# Patient Record
Sex: MALE | Race: BLACK OR AFRICAN AMERICAN | NOT HISPANIC OR LATINO | URBAN - METROPOLITAN AREA
[De-identification: names, ages, dates, MRNs, and addresses within clinical notes are randomized per-mention and may not be internally consistent; named-entity substitution may affect disease eponyms.]

---

## 2018-10-21 ENCOUNTER — EMERGENCY (EMERGENCY)
Facility: HOSPITAL | Age: 23
LOS: 1 days | Discharge: LEFT BEFORE DISCHARGE | End: 2018-10-21
Admitting: EMERGENCY MEDICINE
Payer: MEDICAID

## 2018-10-21 VITALS
TEMPERATURE: 98 F | RESPIRATION RATE: 18 BRPM | SYSTOLIC BLOOD PRESSURE: 127 MMHG | HEART RATE: 98 BPM | DIASTOLIC BLOOD PRESSURE: 92 MMHG | OXYGEN SATURATION: 100 %

## 2018-10-21 DIAGNOSIS — R69 ILLNESS, UNSPECIFIED: ICD-10-CM

## 2018-10-21 DIAGNOSIS — F12.10 CANNABIS ABUSE, UNCOMPLICATED: ICD-10-CM

## 2018-10-21 DIAGNOSIS — F29 UNSPECIFIED PSYCHOSIS NOT DUE TO A SUBSTANCE OR KNOWN PHYSIOLOGICAL CONDITION: ICD-10-CM

## 2018-10-21 LAB
ALBUMIN SERPL ELPH-MCNC: 4.5 G/DL — SIGNIFICANT CHANGE UP (ref 3.3–5)
ALP SERPL-CCNC: 70 U/L — SIGNIFICANT CHANGE UP (ref 40–120)
ALT FLD-CCNC: 21 U/L — SIGNIFICANT CHANGE UP (ref 4–41)
AMPHET UR-MCNC: NEGATIVE — SIGNIFICANT CHANGE UP
APAP SERPL-MCNC: < 15 UG/ML — LOW (ref 15–25)
APPEARANCE UR: CLEAR — SIGNIFICANT CHANGE UP
AST SERPL-CCNC: 18 U/L — SIGNIFICANT CHANGE UP (ref 4–40)
BARBITURATES UR SCN-MCNC: NEGATIVE — SIGNIFICANT CHANGE UP
BASOPHILS # BLD AUTO: 0.04 K/UL — SIGNIFICANT CHANGE UP (ref 0–0.2)
BASOPHILS NFR BLD AUTO: 0.5 % — SIGNIFICANT CHANGE UP (ref 0–2)
BENZODIAZ UR-MCNC: NEGATIVE — SIGNIFICANT CHANGE UP
BILIRUB SERPL-MCNC: 0.4 MG/DL — SIGNIFICANT CHANGE UP (ref 0.2–1.2)
BILIRUB UR-MCNC: NEGATIVE — SIGNIFICANT CHANGE UP
BLOOD UR QL VISUAL: NEGATIVE — SIGNIFICANT CHANGE UP
BUN SERPL-MCNC: 12 MG/DL — SIGNIFICANT CHANGE UP (ref 7–23)
CALCIUM SERPL-MCNC: 9.7 MG/DL — SIGNIFICANT CHANGE UP (ref 8.4–10.5)
CANNABINOIDS UR-MCNC: NEGATIVE — SIGNIFICANT CHANGE UP
CHLORIDE SERPL-SCNC: 102 MMOL/L — SIGNIFICANT CHANGE UP (ref 98–107)
CO2 SERPL-SCNC: 24 MMOL/L — SIGNIFICANT CHANGE UP (ref 22–31)
COCAINE METAB.OTHER UR-MCNC: NEGATIVE — SIGNIFICANT CHANGE UP
COLOR SPEC: SIGNIFICANT CHANGE UP
CREAT SERPL-MCNC: 0.86 MG/DL — SIGNIFICANT CHANGE UP (ref 0.5–1.3)
EOSINOPHIL # BLD AUTO: 0.23 K/UL — SIGNIFICANT CHANGE UP (ref 0–0.5)
EOSINOPHIL NFR BLD AUTO: 2.6 % — SIGNIFICANT CHANGE UP (ref 0–6)
ETHANOL BLD-MCNC: < 10 MG/DL — SIGNIFICANT CHANGE UP
GLUCOSE SERPL-MCNC: 91 MG/DL — SIGNIFICANT CHANGE UP (ref 70–99)
GLUCOSE UR-MCNC: NEGATIVE — SIGNIFICANT CHANGE UP
HCT VFR BLD CALC: 44.7 % — SIGNIFICANT CHANGE UP (ref 39–50)
HGB BLD-MCNC: 14.5 G/DL — SIGNIFICANT CHANGE UP (ref 13–17)
IMM GRANULOCYTES # BLD AUTO: 0.03 # — SIGNIFICANT CHANGE UP
IMM GRANULOCYTES NFR BLD AUTO: 0.3 % — SIGNIFICANT CHANGE UP (ref 0–1.5)
KETONES UR-MCNC: NEGATIVE — SIGNIFICANT CHANGE UP
LEUKOCYTE ESTERASE UR-ACNC: NEGATIVE — SIGNIFICANT CHANGE UP
LYMPHOCYTES # BLD AUTO: 2.32 K/UL — SIGNIFICANT CHANGE UP (ref 1–3.3)
LYMPHOCYTES # BLD AUTO: 26.6 % — SIGNIFICANT CHANGE UP (ref 13–44)
MCHC RBC-ENTMCNC: 25.9 PG — LOW (ref 27–34)
MCHC RBC-ENTMCNC: 32.4 % — SIGNIFICANT CHANGE UP (ref 32–36)
MCV RBC AUTO: 79.8 FL — LOW (ref 80–100)
METHADONE UR-MCNC: NEGATIVE — SIGNIFICANT CHANGE UP
MONOCYTES # BLD AUTO: 0.7 K/UL — SIGNIFICANT CHANGE UP (ref 0–0.9)
MONOCYTES NFR BLD AUTO: 8 % — SIGNIFICANT CHANGE UP (ref 2–14)
NEUTROPHILS # BLD AUTO: 5.41 K/UL — SIGNIFICANT CHANGE UP (ref 1.8–7.4)
NEUTROPHILS NFR BLD AUTO: 62 % — SIGNIFICANT CHANGE UP (ref 43–77)
NITRITE UR-MCNC: NEGATIVE — SIGNIFICANT CHANGE UP
NRBC # FLD: 0 — SIGNIFICANT CHANGE UP
OPIATES UR-MCNC: NEGATIVE — SIGNIFICANT CHANGE UP
OXYCODONE UR-MCNC: NEGATIVE — SIGNIFICANT CHANGE UP
PCP UR-MCNC: NEGATIVE — SIGNIFICANT CHANGE UP
PH UR: 6.5 — SIGNIFICANT CHANGE UP (ref 5–8)
PLATELET # BLD AUTO: 272 K/UL — SIGNIFICANT CHANGE UP (ref 150–400)
PMV BLD: 10 FL — SIGNIFICANT CHANGE UP (ref 7–13)
POTASSIUM SERPL-MCNC: 3.6 MMOL/L — SIGNIFICANT CHANGE UP (ref 3.5–5.3)
POTASSIUM SERPL-SCNC: 3.6 MMOL/L — SIGNIFICANT CHANGE UP (ref 3.5–5.3)
PROT SERPL-MCNC: 8.2 G/DL — SIGNIFICANT CHANGE UP (ref 6–8.3)
PROT UR-MCNC: 10 — SIGNIFICANT CHANGE UP
RBC # BLD: 5.6 M/UL — SIGNIFICANT CHANGE UP (ref 4.2–5.8)
RBC # FLD: 14.5 % — SIGNIFICANT CHANGE UP (ref 10.3–14.5)
SALICYLATES SERPL-MCNC: < 5 MG/DL — LOW (ref 15–30)
SODIUM SERPL-SCNC: 139 MMOL/L — SIGNIFICANT CHANGE UP (ref 135–145)
SP GR SPEC: 1.03 — SIGNIFICANT CHANGE UP (ref 1–1.04)
TSH SERPL-MCNC: 1.85 UIU/ML — SIGNIFICANT CHANGE UP (ref 0.27–4.2)
UROBILINOGEN FLD QL: NORMAL — SIGNIFICANT CHANGE UP
WBC # BLD: 8.73 K/UL — SIGNIFICANT CHANGE UP (ref 3.8–10.5)
WBC # FLD AUTO: 8.73 K/UL — SIGNIFICANT CHANGE UP (ref 3.8–10.5)

## 2018-10-21 PROCEDURE — 93010 ELECTROCARDIOGRAM REPORT: CPT

## 2018-10-21 PROCEDURE — 99283 EMERGENCY DEPT VISIT LOW MDM: CPT | Mod: 25

## 2018-10-21 PROCEDURE — 90792 PSYCH DIAG EVAL W/MED SRVCS: CPT

## 2018-10-21 NOTE — ED BEHAVIORAL HEALTH ASSESSMENT NOTE - HPI (INCLUDE ILLNESS QUALITY, SEVERITY, DURATION, TIMING, CONTEXT, MODIFYING FACTORS, ASSOCIATED SIGNS AND SYMPTOMS)
24 y/o male with no past psych history, daily marijuana use    On assessment, pt states his father brought him to the ED to get help for his depression. Pt reports feeling depressed intermittently for years. States he feels "stuck" in his life. Pt also feels that people don't like him and that he makes people "uncomfortable" because he is quiet. Pt was attending [employer] but dropped out about 1 year ago.  Pt admits that he began feeling paranoid starting about 1 month ago. States he was hanging out with people who were dealing drugs. Pt spent a lot of time at their house. States an ACS investigation was initiated 1 month ago because there are children living with these drug dealers. Pt states he sold drugs on occasion until a few months ago but says he stopped doing this. Since the ACS investigation started, pt believes that law enforcement is following him. He believes multiple neighbors are undercover  and are spying on him. He believes his neighbors are watching him when he steps out of his house. He feels law enforcement is out to arrest him. He believes people on the train take pictures of him to monitor him. For the past month, pt reports poor sleep and poor appetite. He reports difficulty concentrating. He sometimes has low energy. He denies anhedonia, denies hopelessness. Pt denies AH/VH. He denies current suicidal ideation, intent, or plan. He does report feeling suicidal on several occasions due to his paranoia. He most recently experienced suicidal ideation about 1-2 weeks ago. He denies any plan or intent. He denies manic symptoms. Pt reports regular marijuana use since age 19 or 20. He reports almost daily use, though states he last used about 1 week ago. He sometimes smokes cigars. States he last smoked a cigar today. He denies other drug use, denies alcohol use. 22 y/o male, single, noncaregiver, lives with mother, works at a [employer] shop, no past psych history, no h/o violence or legal issues, no PMH, daily marijuana use, brought in by father and stepmother for paranoia x 1 month.     On assessment, pt states his father brought him to the ED to get help for his depression. Pt reports feeling depressed intermittently for years. States he feels "stuck" in his life. Pt also feels that people don't like him and that he makes people "uncomfortable" because he is quiet. Pt was attending [employer] but dropped out about 1 year ago.  Pt admits that he began feeling paranoid starting about 1 month ago. States he was hanging out with people who were dealing drugs. Pt spent a lot of time at their house. States an ACS investigation was initiated 1 month ago because there are children living with these drug dealers. Pt states he sold drugs on occasion until a few months ago but says he stopped doing this. Since the ACS investigation started, pt believes that law enforcement is following him. He believes multiple neighbors are undercover  and are spying on him. He believes his neighbors are watching him when he steps out of his house. He feels law enforcement is out to arrest him. He believes people on the train take pictures of him to monitor him. For the past month, pt reports poor sleep and poor appetite. He reports difficulty concentrating. He sometimes has low energy. He denies anhedonia, denies hopelessness. Pt denies AH/VH. He denies current suicidal ideation, intent, or plan. He does report feeling suicidal on several occasions due to his paranoia. He most recently experienced suicidal ideation about 1-2 weeks ago. He denies any plan or intent. He denies manic symptoms. Pt reports regular marijuana use since age 19 or 20. He reports almost daily use, though states he last used about 1 week ago. He sometimes smokes cigars. States he last smoked a cigar today. He denies other drug use, denies alcohol use.    Collateral obtained from pt's father and stepmother. They report he has been paranoid for the past month. He thinks the police are following him. He smokes marijuana regularly. They are unaware of any other drug use or alcohol use. They state he has always been relatively quiet and introverted, but he has appeared more withdrawn and isolative for about 1 year. He dropped out of college last year. He has been eating less than he did in the past, but he is still eating adequately. He is sleeping less overall. He is showering and attending to ADL's. He has not verbalized SI/HI, and he has not been physically aggressive or violent.   Pt has no past psych history, no medical history. No history of suicide attempts or violence. Pt's father was hospitalized once in the past for becoming very paranoid after smoking marijuana. Father's cousin has Schizophrenia. Pt's father and stepmother want pt to get psychiatric treatment, but they deny acute safety concerns.

## 2018-10-21 NOTE — ED ADULT NURSE NOTE - CHIEF COMPLAINT QUOTE
Co paranoia x  1 month, states " I feel like people are watching me. I feel like the police are doing an investigation on me". States he has had suicidal thoughts in the past, denies these thoughts currently. States he smokes marijuana daily, but denies using today. Denies alcohol use. Denies any psych hx in the past.

## 2018-10-21 NOTE — ED PROVIDER NOTE - OBJECTIVE STATEMENT
22 y/o male patient 22 y/o male patient presents to  with c/o "paranoia and anxiety".  Pt states: "I feel like people are following me and I'm being watched".  As per pt's dad, "he also feels like the police are building a case against him".  "Twice he's jumped into the back of my car and said, drive off, drive off, they're following me, they're following me".  Family is concerned that patient may need admission.  PT admits to using marijuana daily and smoking cigars.

## 2018-10-21 NOTE — ED BEHAVIORAL HEALTH ASSESSMENT NOTE - SUMMARY
24 y/o male, single, noncaregiver, lives with mother, works at a [employer] shop, no past psych history, no h/o violence or legal issues, no PMH, daily marijuana use, brought in by father and stepmother for paranoia x 1 month.     Patient denies SI/HI, and he is calm, cooperative, and in good behavioral control. He is caring for himself. Pt's father denies acute safety concerns. Pt does not present an acute danger to self or others and does not meet criteria for involuntary psychiatric admission at this time. Pt declines voluntary psychiatric admission.

## 2018-10-21 NOTE — ED ADULT NURSE NOTE - OBJECTIVE STATEMENT
Patient received in  c/o psych eval, patient endorsed recent onset of paranoia stating "stating I feel like someone is always following me, I feel like i'm being investigated by the FBI". Patient also endorsed recent onset of anxiety and panic attacks. Patient denies SI at time but endorsed SI in the past. denies HI&AH. Patient denies ETOH, endorsed MJ use. safety and comfort measures maintained. will continue to monitor.

## 2018-10-21 NOTE — ED ADULT NURSE NOTE - NSIMPLEMENTINTERV_GEN_ALL_ED
Implemented All Universal Safety Interventions:  [place] to call system. Call bell, personal items and telephone within reach. Instruct patient to call for assistance. Room bathroom lighting operational. Non-slip footwear when patient is off stretcher. Physically safe environment: no spills, clutter or unnecessary equipment. Stretcher in lowest position, wheels locked, appropriate side rails in place.

## 2018-10-21 NOTE — ED ADULT TRIAGE NOTE - CHIEF COMPLAINT QUOTE
Co paranoia x  month, states " I feel like people are watching me. I feel like the police are doing an investigation on me". States he has had suicidal thoughts in the past, denies these thoughts currently. States he smokes marijuana daily, but denies using today. Denies alcohol use. Denies any psych hx in the past. Co paranoia x  1 month, states " I feel like people are watching me. I feel like the police are doing an investigation on me". States he has had suicidal thoughts in the past, denies these thoughts currently. States he smokes marijuana daily, but denies using today. Denies alcohol use. Denies any psych hx in the past.

## 2018-10-21 NOTE — ED ADULT NURSE REASSESSMENT NOTE - NS ED NURSE REASSESS COMMENT FT1
Break Coverage -  pt calm & cooperative d/c by NP pt verbalizing understanding of d/c instructions pt denies Si/Hi/AVh presently pt left the ER accompanied by his father.

## 2018-10-21 NOTE — ED PROVIDER NOTE - MEDICAL DECISION MAKING DETAILS
22 y/o male patient presenting to ED for c/o paranoia.  Medical evaluation performed.  No clinical evidence of acute intoxication or any acute medical issues.  Psychiatric consult completed.  Pt cleared for stable discharge.  Pt and father given brochure for [facility] crisis center for outpatient  f/u.

## 2018-10-21 NOTE — ED BEHAVIORAL HEALTH ASSESSMENT NOTE - DESCRIPTION
calm, cooperative, in good behavioral control    Vital Signs Last 24 Hrs  T(C): 36.7 (21 Oct 2018 15:35), Max: 36.7 (21 Oct 2018 15:35)  T(F): 98 (21 Oct 2018 15:35), Max: 98 (21 Oct 2018 15:35)  HR: 98 (21 Oct 2018 15:35) (98 - 98)  BP: 127/92 (21 Oct 2018 15:35) (127/92 - 127/92)  BP(mean): --  RR: 18 (21 Oct 2018 15:35) (18 - 18)  SpO2: 100% (21 Oct 2018 15:35) (100% - 100%) none see HPI

## 2019-10-02 ENCOUNTER — INPATIENT (INPATIENT)
Facility: HOSPITAL | Age: 24
LOS: 21 days | Discharge: HOME | End: 2019-10-24
Attending: PSYCHIATRY & NEUROLOGY | Admitting: PSYCHIATRY & NEUROLOGY
Payer: COMMERCIAL

## 2019-10-02 VITALS
OXYGEN SATURATION: 100 % | HEART RATE: 84 BPM | DIASTOLIC BLOOD PRESSURE: 64 MMHG | RESPIRATION RATE: 16 BRPM | SYSTOLIC BLOOD PRESSURE: 149 MMHG | TEMPERATURE: 98 F

## 2019-10-02 DIAGNOSIS — F29 UNSPECIFIED PSYCHOSIS NOT DUE TO A SUBSTANCE OR KNOWN PHYSIOLOGICAL CONDITION: ICD-10-CM

## 2019-10-02 LAB
ALBUMIN SERPL ELPH-MCNC: 4.7 G/DL — SIGNIFICANT CHANGE UP (ref 3.3–5)
ALP SERPL-CCNC: 86 U/L — SIGNIFICANT CHANGE UP (ref 40–120)
ALT FLD-CCNC: 20 U/L — SIGNIFICANT CHANGE UP (ref 4–41)
ANION GAP SERPL CALC-SCNC: 12 MMO/L — SIGNIFICANT CHANGE UP (ref 7–14)
ANION GAP SERPL CALC-SCNC: 23 MMO/L — HIGH (ref 7–14)
ANISOCYTOSIS BLD QL: SLIGHT — SIGNIFICANT CHANGE UP
APAP SERPL-MCNC: < 15 UG/ML — LOW (ref 15–25)
AST SERPL-CCNC: 24 U/L — SIGNIFICANT CHANGE UP (ref 4–40)
BASE EXCESS BLDV CALC-SCNC: -2 MMOL/L — SIGNIFICANT CHANGE UP
BASOPHILS # BLD AUTO: 0.06 K/UL — SIGNIFICANT CHANGE UP (ref 0–0.2)
BASOPHILS NFR BLD AUTO: 0.5 % — SIGNIFICANT CHANGE UP (ref 0–2)
BILIRUB SERPL-MCNC: < 0.2 MG/DL — LOW (ref 0.2–1.2)
BLOOD GAS VENOUS - CREATININE: 0.84 MG/DL — SIGNIFICANT CHANGE UP (ref 0.5–1.3)
BUN SERPL-MCNC: 12 MG/DL — SIGNIFICANT CHANGE UP (ref 7–23)
BUN SERPL-MCNC: 15 MG/DL — SIGNIFICANT CHANGE UP (ref 7–23)
CALCIUM SERPL-MCNC: 10.2 MG/DL — SIGNIFICANT CHANGE UP (ref 8.4–10.5)
CALCIUM SERPL-MCNC: 9 MG/DL — SIGNIFICANT CHANGE UP (ref 8.4–10.5)
CHLORIDE BLDV-SCNC: 109 MMOL/L — HIGH (ref 96–108)
CHLORIDE SERPL-SCNC: 107 MMOL/L — SIGNIFICANT CHANGE UP (ref 98–107)
CHLORIDE SERPL-SCNC: 107 MMOL/L — SIGNIFICANT CHANGE UP (ref 98–107)
CO2 SERPL-SCNC: 15 MMOL/L — LOW (ref 22–31)
CO2 SERPL-SCNC: 20 MMOL/L — LOW (ref 22–31)
CREAT SERPL-MCNC: 0.85 MG/DL — SIGNIFICANT CHANGE UP (ref 0.5–1.3)
CREAT SERPL-MCNC: 1.02 MG/DL — SIGNIFICANT CHANGE UP (ref 0.5–1.3)
EOSINOPHIL # BLD AUTO: 0.25 K/UL — SIGNIFICANT CHANGE UP (ref 0–0.5)
EOSINOPHIL NFR BLD AUTO: 1.9 % — SIGNIFICANT CHANGE UP (ref 0–6)
ETHANOL BLD-MCNC: < 10 MG/DL — SIGNIFICANT CHANGE UP
GAS PNL BLDV: 137 MMOL/L — SIGNIFICANT CHANGE UP (ref 136–146)
GLUCOSE BLDV-MCNC: 80 MG/DL — SIGNIFICANT CHANGE UP (ref 70–99)
GLUCOSE SERPL-MCNC: 104 MG/DL — HIGH (ref 70–99)
GLUCOSE SERPL-MCNC: 83 MG/DL — SIGNIFICANT CHANGE UP (ref 70–99)
HCO3 BLDV-SCNC: 22 MMOL/L — SIGNIFICANT CHANGE UP (ref 20–27)
HCT VFR BLD CALC: 43.9 % — SIGNIFICANT CHANGE UP (ref 39–50)
HCT VFR BLDV CALC: 32.6 % — LOW (ref 39–51)
HGB BLD-MCNC: 12 G/DL — LOW (ref 13–17)
HGB BLDV-MCNC: 10.6 G/DL — LOW (ref 13–17)
HYPOCHROMIA BLD QL: SLIGHT — SIGNIFICANT CHANGE UP
IMM GRANULOCYTES NFR BLD AUTO: 0.4 % — SIGNIFICANT CHANGE UP (ref 0–1.5)
LACTATE BLDV-MCNC: 1.1 MMOL/L — SIGNIFICANT CHANGE UP (ref 0.5–2)
LYMPHOCYTES # BLD AUTO: 34.5 % — SIGNIFICANT CHANGE UP (ref 13–44)
LYMPHOCYTES # BLD AUTO: 4.51 K/UL — HIGH (ref 1–3.3)
MAGNESIUM SERPL-MCNC: 2 MG/DL — SIGNIFICANT CHANGE UP (ref 1.6–2.6)
MANUAL SMEAR VERIFICATION: SIGNIFICANT CHANGE UP
MCHC RBC-ENTMCNC: 19.9 PG — LOW (ref 27–34)
MCHC RBC-ENTMCNC: 27.3 % — LOW (ref 32–36)
MCV RBC AUTO: 72.9 FL — LOW (ref 80–100)
MICROCYTES BLD QL: SLIGHT — SIGNIFICANT CHANGE UP
MONOCYTES # BLD AUTO: 1.45 K/UL — HIGH (ref 0–0.9)
MONOCYTES NFR BLD AUTO: 11.1 % — SIGNIFICANT CHANGE UP (ref 2–14)
NEUTROPHILS # BLD AUTO: 6.74 K/UL — SIGNIFICANT CHANGE UP (ref 1.8–7.4)
NEUTROPHILS NFR BLD AUTO: 51.6 % — SIGNIFICANT CHANGE UP (ref 43–77)
NRBC # FLD: 0 K/UL — SIGNIFICANT CHANGE UP (ref 0–0)
PCO2 BLDV: 44 MMHG — SIGNIFICANT CHANGE UP (ref 41–51)
PH BLDV: 7.34 PH — SIGNIFICANT CHANGE UP (ref 7.32–7.43)
PHOSPHATE SERPL-MCNC: 2.9 MG/DL — SIGNIFICANT CHANGE UP (ref 2.5–4.5)
PLATELET # BLD AUTO: 329 K/UL — SIGNIFICANT CHANGE UP (ref 150–400)
PMV BLD: 10.3 FL — SIGNIFICANT CHANGE UP (ref 7–13)
PO2 BLDV: 62 MMHG — HIGH (ref 35–40)
POTASSIUM BLDV-SCNC: 3.4 MMOL/L — SIGNIFICANT CHANGE UP (ref 3.4–4.5)
POTASSIUM SERPL-MCNC: 3.7 MMOL/L — SIGNIFICANT CHANGE UP (ref 3.5–5.3)
POTASSIUM SERPL-MCNC: 4.3 MMOL/L — SIGNIFICANT CHANGE UP (ref 3.5–5.3)
POTASSIUM SERPL-SCNC: 3.7 MMOL/L — SIGNIFICANT CHANGE UP (ref 3.5–5.3)
POTASSIUM SERPL-SCNC: 4.3 MMOL/L — SIGNIFICANT CHANGE UP (ref 3.5–5.3)
PROT SERPL-MCNC: 8.6 G/DL — HIGH (ref 6–8.3)
RBC # BLD: 6.02 M/UL — HIGH (ref 4.2–5.8)
RBC # FLD: 16.8 % — HIGH (ref 10.3–14.5)
REVIEW TO FOLLOW: YES — SIGNIFICANT CHANGE UP
SALICYLATES SERPL-MCNC: < 5 MG/DL — LOW (ref 15–30)
SAO2 % BLDV: 88.9 % — HIGH (ref 60–85)
SODIUM SERPL-SCNC: 139 MMOL/L — SIGNIFICANT CHANGE UP (ref 135–145)
SODIUM SERPL-SCNC: 145 MMOL/L — SIGNIFICANT CHANGE UP (ref 135–145)
TSH SERPL-MCNC: 2.76 UIU/ML — SIGNIFICANT CHANGE UP (ref 0.27–4.2)
WBC # BLD: 13.06 K/UL — HIGH (ref 3.8–10.5)
WBC # FLD AUTO: 13.06 K/UL — HIGH (ref 3.8–10.5)

## 2019-10-02 PROCEDURE — 99285 EMERGENCY DEPT VISIT HI MDM: CPT

## 2019-10-02 RX ORDER — HALOPERIDOL DECANOATE 100 MG/ML
5 INJECTION INTRAMUSCULAR EVERY 6 HOURS
Refills: 0 | Status: DISCONTINUED | OUTPATIENT
Start: 2019-10-02 | End: 2019-10-24

## 2019-10-02 RX ORDER — HALOPERIDOL DECANOATE 100 MG/ML
5 INJECTION INTRAMUSCULAR ONCE
Refills: 0 | Status: COMPLETED | OUTPATIENT
Start: 2019-10-02 | End: 2019-10-02

## 2019-10-02 RX ORDER — HALOPERIDOL DECANOATE 100 MG/ML
5 INJECTION INTRAMUSCULAR ONCE
Refills: 0 | Status: DISCONTINUED | OUTPATIENT
Start: 2019-10-02 | End: 2019-10-24

## 2019-10-02 RX ORDER — RISPERIDONE 4 MG/1
1 TABLET ORAL
Refills: 0 | Status: DISCONTINUED | OUTPATIENT
Start: 2019-10-02 | End: 2019-10-03

## 2019-10-02 RX ADMIN — HALOPERIDOL DECANOATE 5 MILLIGRAM(S): 100 INJECTION INTRAMUSCULAR at 09:01

## 2019-10-02 RX ADMIN — Medication 2 MILLIGRAM(S): at 08:48
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
## 2019-10-02 NOTE — ED ADULT NURSE REASSESSMENT NOTE - NS ED NURSE REASSESS COMMENT FT1
Patient admitted to [facility]/ [facility], report given to Ej TRAVIS, medical clearance complete, pt currently awaiting EMS transport, will continue to monitor pt.

## 2019-10-02 NOTE — ED BEHAVIORAL HEALTH ASSESSMENT NOTE - SUMMARY
22 y/o male, single, noncaregiver, lives with mother, works at a pizza shop, no past psych history, no h/o violence or legal issues, no PMH, daily marijuana use, brought in by EMS for acute agitation. 24 y/o male, single, noncaregiver, lives with mother, works at a [employer] shop, no past psych history, no h/o violence or legal issues, no PMH, daily marijuana use, brought in by EMS for acute agitation.  Patient is acutely agitated on presentation and requires emergent medications with Ativan 2mg IM and then Haldol 5mg IM administered for safety as well as period of 4 point restraints and constant observation.  On assessment, patient is agitated and expresses paranoia, but refuses to fully participate in interview.  Of note when asked about feeling paranoid patient does respond superficially and defensive, appears to be internally stimulated.  Patient denies current suicidal ideation, intent, or plan, denies homicidal ideation or violent thoughts.  He denies drug use, denies alcohol use.  At this time patient is acutely paranoid, appears psychotic and unable to maintain behavioral control without medications, requiring psychiatric hospitalization for safety and stabilization.  Admit to [facility] 4 on a 9.39 involuntary emergent legal status.  No need for constant observation in a locked, supervised setting.  Recommend EMS transportation with buckle guard for safety. 24 y/o male, single, noncaregiver, lives with mother, past psych history of psychosis, no h/o violence or legal issues, no PMH, daily marijuana use, brought in by EMS for acute agitation.  Patient is acutely agitated on presentation and requires emergent medications with Ativan 2mg IM and then Haldol 5mg IM administered for safety as well as period of 4 point restraints and constant observation.  On assessment, patient is agitated and expresses paranoia, but refuses to fully participate in interview.  Of note when asked about feeling paranoid patient does respond superficially and defensive, appears to be internally stimulated.  Patient denies current suicidal ideation, intent, or plan, denies homicidal ideation or violent thoughts.  He denies drug use, denies alcohol use.  At this time patient is acutely paranoid, appears psychotic and unable to maintain behavioral control without medications, requiring psychiatric hospitalization for safety and stabilization.  Admit to [facility] 4 on a 9.39 involuntary emergent legal status.  No need for constant observation in a locked, supervised setting.  Recommend EMS transportation with buckle guard for safety.

## 2019-10-02 NOTE — ED PROVIDER NOTE - OBJECTIVE STATEMENT
This is a 24 yr old m, pmh schizophrenia with c/o increased aggression towards family. Pt arrived to  in handcuff. 911 was activated by his parents due to acting out aggressive behaviour. As per ems family was afraid of him and worried about their own safety. Pt resistive non cooperative.

## 2019-10-02 NOTE — ED BEHAVIORAL HEALTH ASSESSMENT NOTE - SUICIDE PROTECTIVE FACTORS
Supportive social network of family or friends/Identifies reasons for living Supportive social network of family or friends

## 2019-10-02 NOTE — ED BEHAVIORAL HEALTH ASSESSMENT NOTE - DESCRIPTION
Vital Signs Last 24 Hrs  T(C): 36.4 (02 Oct 2019 09:05), Max: 36.5 (02 Oct 2019 08:02)  T(F): 97.5 (02 Oct 2019 09:05), Max: 97.7 (02 Oct 2019 08:02)  HR: 72 (02 Oct 2019 09:05) (72 - 84)  BP: 122/55 (02 Oct 2019 09:05) (122/55 - 149/64)  BP(mean): --  RR: 16 (02 Oct 2019 09:05) (16 - 16)  SpO2: 98% (02 Oct 2019 09:05) (98% - 100%) none see HPI agitated on initial presentation, medicated with Haldol 5mg and Ativan 2mg IM for violent risk with benefit, and required a period of 4-point restraints and 1:1 observation    Vital Signs Last 24 Hrs  T(C): 36.4 (02 Oct 2019 09:05), Max: 36.5 (02 Oct 2019 08:02)  T(F): 97.5 (02 Oct 2019 09:05), Max: 97.7 (02 Oct 2019 08:02)  HR: 72 (02 Oct 2019 09:05) (72 - 84)  BP: 122/55 (02 Oct 2019 09:05) (122/55 - 149/64)  BP(mean): --  RR: 16 (02 Oct 2019 09:05) (16 - 16)  SpO2: 98% (02 Oct 2019 09:05) (98% - 100%)

## 2019-10-02 NOTE — ED BEHAVIORAL HEALTH ASSESSMENT NOTE - OTHER PAST PSYCHIATRIC HISTORY (INCLUDE DETAILS REGARDING ONSET, COURSE OF ILLNESS, INPATIENT/OUTPATIENT TREATMENT)
none Documented history of psychotic disorder (seen at [facility] ED on 10/21/18 and treated and released) and past diagnosis of schizophrenia as per psyckes with [facility] on 4/22/19.  No documented past history of psychiatric hospitalization.  No history of suicide attempts.

## 2019-10-02 NOTE — ED ADULT NURSE NOTE - PATIENT IS UNABLE TO BE SCREENED DUE TO:
----- Message from Galina Augustin RN sent at 4/20/2019  3:58 PM CDT -----  Pt in  ED for COPD exacerbation. Pt has a hard time caring for self at home. Pt states that he would be interested in possibly receiving home care services.    Thanks!  Galina    Patient refused

## 2019-10-02 NOTE — ED ADULT NURSE NOTE - OBJECTIVE STATEMENT
pt brought by EMS from home by family due treating them, agitated, refusing to answering questions, pt refused blood drawn, refused to be exam by MD, pt became agitated, violent, aggressive, was place on 4 points restrains for his self and others pt was medicated, Level 2 started, at 8:49AM and restrained was release at 9:49AM.     pt cooperative at times, will cont. to be reassessed.       Martina Yi RN

## 2019-10-02 NOTE — ED BEHAVIORAL HEALTH ASSESSMENT NOTE - NS ED BHA PLAN ADMIT TO PSYCHIATRY ADMIT TO
Goals of Care/Treatment Preferences The Palliative Medicine team was consulted as part of your/your loved one's care in the hospital. Our team is a supportive service; we strive to relieve suffering and improve quality of life. You met with Dr. Samuel Solomon. We reviewed advance care planning information, which includes the following: 
Confirm Advance Directive: Yes, on file Patient/Health Care Proxy Stated Goals: Prolong life We reviewed / discussed your code status as: DNR 
   Full Code means perform CPR in the event of cardiac arrest. 
    DNR means do NOT perform CPR in the event of cardiac arrest. 
    Partial Code means you have specific preferences, please discuss with your healthcare team. 
    Rishi Pean means this issue was not addressed / resolved during your stay Medical Interventions: Limited additional interventions Other Instructions: While in the hospital a Durable Do Not Resuscitate Order was completed on your behalf. This form should travel with you. While in a facility, the DDNR should be placed on your chart. Once home, it is recommended that this form be placed in a visible location such as on the refrigerator or bedroom door. Because of the importance of this information, we are providing you with a printed copy to share with other healthcare providers after this hospitalization is complete.
[facility]

## 2019-10-02 NOTE — ED ADULT NURSE NOTE - NSSUHOSCREENINGYN_ED_ALL_ED
The patient is a 81y Male complaining of back pain general.
No - the patient is unable to be screened due to medical condition

## 2019-10-02 NOTE — ED BEHAVIORAL HEALTH ASSESSMENT NOTE - DIFFERENTIAL
unspecified psychosis   substance-induced psychosis unspecified psychosis; r/o schizophrenia   substance-induced psychosis

## 2019-10-02 NOTE — ED PROVIDER NOTE - CLINICAL SUMMARY MEDICAL DECISION MAKING FREE TEXT BOX
This is a 24 yr old m, pmh schizophrenia with c/o increased aggression towards family.  Non cooperative, agitative, medication ordered.  Blood work, ua/ toxicology and serum to r/o underlying medical causes.  Psych consult requested. This is a 24 yr old m, pmh schizophrenia with c/o increased aggression towards family.  Non cooperative, agitative, medication ordered.  Blood work, ua/ toxicology and serum to r/o underlying medical causes.  Psych consult requested.- will adm further inpatient psychiatric treatment.

## 2019-10-02 NOTE — ED BEHAVIORAL HEALTH ASSESSMENT NOTE - SUBSTANCE ISSUES AND PLAN (INCLUDE STANDING AND PRN MEDICATION)
no signs or symptoms of substance intoxication or withdrawal, no indication for rehab/detox/CIWA/CINA

## 2019-10-02 NOTE — ED ADULT TRIAGE NOTE - CHIEF COMPLAINT QUOTE
pt from home s/p argument with parents. parents state pt has been dx with schizophrenia in march and noncompliant with meds and fear for their safety. as per ems pt was calm upon arrival but is noncompliant with questioning currently, respirations even and unlabored, no injuries noted. pt from home s/p argument with parents. parents state pt has been dx with schizophrenia in march and noncompliant with meds and fear for their safety, hx of aggression with family. as per ems pt was calm upon arrival but is noncompliant with questioning currently, respirations even and unlabored, no injuries noted.
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
## 2019-10-02 NOTE — ED ADULT NURSE NOTE - CHIEF COMPLAINT QUOTE
pt from home s/p argument with parents. parents state pt has been dx with schizophrenia in march and noncompliant with meds and fear for their safety, hx of aggression with family. as per ems pt was calm upon arrival but is noncompliant with questioning currently, respirations even and unlabored, no injuries noted.
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
## 2019-10-02 NOTE — ED BEHAVIORAL HEALTH ASSESSMENT NOTE - RISK ASSESSMENT
pt is not at acutely elevated risk of harm to self or others pt is is at acutely elevated risk of harm to self or others Low Acute Suicide Risk pt is is at acutely elevated risk of harm to self or others with risk factors of acute agitation, psychosis with history of cannabis abuse.  Not engaged in work and socially isolative, no outpatient psychiatric treatment in place.  Has protective factors as no past psychiatric hospitalizations and supportive family.

## 2019-10-02 NOTE — ED BEHAVIORAL HEALTH ASSESSMENT NOTE - OTHER
father and stepmother see HPI denies AH/VH/TH, appears internally stimulated [id number] father and mother unavailable, previously worked at a pizza shop

## 2019-10-02 NOTE — ED BEHAVIORAL HEALTH ASSESSMENT NOTE - DETAILS
no documented history but refuses to elaborate on exam admit to [facility], message left for Dr. Fisher mother informed of disposition, d/w EM provider

## 2019-10-02 NOTE — ED PROVIDER NOTE - PROGRESS NOTE DETAILS
Hazel NP- pt placed in restrain at 0849 due to agitation and risk to self harm and injury to others. Pt became very resistive was unable to redirect and deescalate situation, medication ordered and adm. Waiting for lab results, psych consult will reassess. Hazel NP- anion gap elevated 23, encourage PO fluid intake after will repeat BMP. Pt awake alert, calm , eating breakfast, no distress, will reevaluate. Hazel NP- repeat labs not concerning, pt will be admitted under psych service for further inpatient treatment. Gong: Patient noted to have improved BMP anion gap improved with eating and PO fluids.  VBG noting no acidosis, no lactate.

## 2019-10-02 NOTE — ED BEHAVIORAL HEALTH ASSESSMENT NOTE - SUICIDE RISK FACTORS
Alcohol/Substance abuse disorders/Insomnia/Access to lethal methods (pills, firearm, etc.: Ask specifically about presence or absence of a firearm in the home or ease of accessing Alcohol/Substance abuse disorders/Psychotic disorder current/past/Agitation/Severe Anxiety/Panic

## 2019-10-03 RX ORDER — RISPERIDONE 4 MG/1
2 TABLET ORAL AT BEDTIME
Refills: 0 | Status: DISCONTINUED | OUTPATIENT
Start: 2019-10-03 | End: 2019-10-04

## 2019-10-04 PROCEDURE — 99232 SBSQ HOSP IP/OBS MODERATE 35: CPT

## 2019-10-04 RX ORDER — RISPERIDONE 4 MG/1
3 TABLET ORAL AT BEDTIME
Refills: 0 | Status: DISCONTINUED | OUTPATIENT
Start: 2019-10-05 | End: 2019-10-07

## 2019-10-04 RX ORDER — RISPERIDONE 4 MG/1
2 TABLET ORAL AT BEDTIME
Refills: 0 | Status: COMPLETED | OUTPATIENT
Start: 2019-10-04 | End: 2019-10-04

## 2019-10-04 RX ADMIN — Medication 2 MILLIGRAM(S): at 23:49

## 2019-10-04 RX ADMIN — RISPERIDONE 2 MILLIGRAM(S): 4 TABLET ORAL at 23:49

## 2019-10-05 PROCEDURE — 99231 SBSQ HOSP IP/OBS SF/LOW 25: CPT

## 2019-10-05 RX ADMIN — Medication 2 MILLIGRAM(S): at 19:59

## 2019-10-05 RX ADMIN — HALOPERIDOL DECANOATE 5 MILLIGRAM(S): 100 INJECTION INTRAMUSCULAR at 12:37

## 2019-10-05 RX ADMIN — RISPERIDONE 3 MILLIGRAM(S): 4 TABLET ORAL at 20:00

## 2019-10-06 PROCEDURE — 99231 SBSQ HOSP IP/OBS SF/LOW 25: CPT

## 2019-10-06 RX ADMIN — HALOPERIDOL DECANOATE 5 MILLIGRAM(S): 100 INJECTION INTRAMUSCULAR at 10:30

## 2019-10-06 RX ADMIN — RISPERIDONE 3 MILLIGRAM(S): 4 TABLET ORAL at 20:00

## 2019-10-06 RX ADMIN — Medication 2 MILLIGRAM(S): at 17:31

## 2019-10-07 PROCEDURE — 99232 SBSQ HOSP IP/OBS MODERATE 35: CPT

## 2019-10-07 RX ORDER — RISPERIDONE 4 MG/1
4 TABLET ORAL AT BEDTIME
Refills: 0 | Status: DISCONTINUED | OUTPATIENT
Start: 2019-10-07 | End: 2019-10-15

## 2019-10-07 RX ORDER — IBUPROFEN 200 MG
400 TABLET ORAL ONCE
Refills: 0 | Status: COMPLETED | OUTPATIENT
Start: 2019-10-07 | End: 2019-10-07

## 2019-10-07 RX ORDER — HALOPERIDOL DECANOATE 100 MG/ML
5 INJECTION INTRAMUSCULAR ONCE
Refills: 0 | Status: COMPLETED | OUTPATIENT
Start: 2019-10-07 | End: 2019-10-07

## 2019-10-07 RX ADMIN — Medication 400 MILLIGRAM(S): at 22:46

## 2019-10-07 RX ADMIN — Medication 2 MILLIGRAM(S): at 23:01

## 2019-10-07 RX ADMIN — Medication 2 MILLIGRAM(S): at 20:12

## 2019-10-07 RX ADMIN — Medication 400 MILLIGRAM(S): at 23:46

## 2019-10-07 RX ADMIN — HALOPERIDOL DECANOATE 5 MILLIGRAM(S): 100 INJECTION INTRAMUSCULAR at 23:01

## 2019-10-07 RX ADMIN — HALOPERIDOL DECANOATE 5 MILLIGRAM(S): 100 INJECTION INTRAMUSCULAR at 20:12

## 2019-10-07 RX ADMIN — RISPERIDONE 4 MILLIGRAM(S): 4 TABLET ORAL at 20:12
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
## 2019-10-08 RX ORDER — ACETAMINOPHEN 500 MG
650 TABLET ORAL EVERY 6 HOURS
Refills: 0 | Status: DISCONTINUED | OUTPATIENT
Start: 2019-10-08 | End: 2019-10-24

## 2019-10-08 RX ADMIN — RISPERIDONE 4 MILLIGRAM(S): 4 TABLET ORAL at 19:39

## 2019-10-08 RX ADMIN — Medication 2 MILLIGRAM(S): at 19:39

## 2019-10-08 RX ADMIN — HALOPERIDOL DECANOATE 5 MILLIGRAM(S): 100 INJECTION INTRAMUSCULAR at 11:57

## 2019-10-08 RX ADMIN — Medication 2 MILLIGRAM(S): at 11:57

## 2019-10-08 RX ADMIN — Medication 650 MILLIGRAM(S): at 19:40

## 2019-10-08 RX ADMIN — Medication 650 MILLIGRAM(S): at 21:20

## 2019-10-08 RX ADMIN — HALOPERIDOL DECANOATE 5 MILLIGRAM(S): 100 INJECTION INTRAMUSCULAR at 19:39

## 2019-10-09 PROCEDURE — 99232 SBSQ HOSP IP/OBS MODERATE 35: CPT

## 2019-10-09 RX ORDER — PALIPERIDONE 1.5 MG/1
1 TABLET, EXTENDED RELEASE ORAL
Qty: 30 | Refills: 0
Start: 2019-10-09 | End: 2019-11-07

## 2019-10-09 RX ADMIN — RISPERIDONE 4 MILLIGRAM(S): 4 TABLET ORAL at 20:09

## 2019-10-09 RX ADMIN — Medication 2 MILLIGRAM(S): at 20:09

## 2019-10-09 RX ADMIN — Medication 2 MILLIGRAM(S): at 08:22

## 2019-10-09 RX ADMIN — HALOPERIDOL DECANOATE 5 MILLIGRAM(S): 100 INJECTION INTRAMUSCULAR at 08:22

## 2019-10-10 RX ADMIN — RISPERIDONE 4 MILLIGRAM(S): 4 TABLET ORAL at 20:29

## 2019-10-10 RX ADMIN — HALOPERIDOL DECANOATE 5 MILLIGRAM(S): 100 INJECTION INTRAMUSCULAR at 15:45

## 2019-10-10 RX ADMIN — HALOPERIDOL DECANOATE 5 MILLIGRAM(S): 100 INJECTION INTRAMUSCULAR at 20:29

## 2019-10-10 RX ADMIN — Medication 2 MILLIGRAM(S): at 20:29

## 2019-10-10 RX ADMIN — Medication 2 MILLIGRAM(S): at 11:48

## 2019-10-11 RX ADMIN — RISPERIDONE 4 MILLIGRAM(S): 4 TABLET ORAL at 21:17

## 2019-10-11 RX ADMIN — HALOPERIDOL DECANOATE 5 MILLIGRAM(S): 100 INJECTION INTRAMUSCULAR at 21:17

## 2019-10-11 RX ADMIN — Medication 2 MILLIGRAM(S): at 15:47

## 2019-10-11 RX ADMIN — Medication 2 MILLIGRAM(S): at 21:17

## 2019-10-11 RX ADMIN — Medication 2 MILLIGRAM(S): at 08:15

## 2019-10-11 RX ADMIN — HALOPERIDOL DECANOATE 5 MILLIGRAM(S): 100 INJECTION INTRAMUSCULAR at 08:15

## 2019-10-11 RX ADMIN — HALOPERIDOL DECANOATE 5 MILLIGRAM(S): 100 INJECTION INTRAMUSCULAR at 15:47

## 2019-10-12 RX ADMIN — HALOPERIDOL DECANOATE 5 MILLIGRAM(S): 100 INJECTION INTRAMUSCULAR at 20:02

## 2019-10-12 RX ADMIN — Medication 2 MILLIGRAM(S): at 20:02

## 2019-10-12 RX ADMIN — Medication 2 MILLIGRAM(S): at 10:09

## 2019-10-12 RX ADMIN — HALOPERIDOL DECANOATE 5 MILLIGRAM(S): 100 INJECTION INTRAMUSCULAR at 10:09

## 2019-10-12 RX ADMIN — RISPERIDONE 4 MILLIGRAM(S): 4 TABLET ORAL at 20:02

## 2019-10-13 RX ADMIN — HALOPERIDOL DECANOATE 5 MILLIGRAM(S): 100 INJECTION INTRAMUSCULAR at 09:45

## 2019-10-13 RX ADMIN — Medication 2 MILLIGRAM(S): at 09:45

## 2019-10-13 RX ADMIN — HALOPERIDOL DECANOATE 5 MILLIGRAM(S): 100 INJECTION INTRAMUSCULAR at 20:24

## 2019-10-13 RX ADMIN — Medication 2 MILLIGRAM(S): at 20:25

## 2019-10-13 RX ADMIN — RISPERIDONE 4 MILLIGRAM(S): 4 TABLET ORAL at 20:25

## 2019-10-14 PROCEDURE — 99233 SBSQ HOSP IP/OBS HIGH 50: CPT

## 2019-10-14 RX ORDER — LITHIUM CARBONATE 300 MG/1
300 TABLET, EXTENDED RELEASE ORAL AT BEDTIME
Refills: 0 | Status: DISCONTINUED | OUTPATIENT
Start: 2019-10-14 | End: 2019-10-15

## 2019-10-14 RX ADMIN — LITHIUM CARBONATE 300 MILLIGRAM(S): 300 TABLET, EXTENDED RELEASE ORAL at 20:12

## 2019-10-14 RX ADMIN — RISPERIDONE 4 MILLIGRAM(S): 4 TABLET ORAL at 20:12

## 2019-10-14 RX ADMIN — Medication 2 MILLIGRAM(S): at 20:12

## 2019-10-14 RX ADMIN — HALOPERIDOL DECANOATE 5 MILLIGRAM(S): 100 INJECTION INTRAMUSCULAR at 20:12

## 2019-10-15 RX ORDER — LITHIUM CARBONATE 300 MG/1
600 TABLET, EXTENDED RELEASE ORAL AT BEDTIME
Refills: 0 | Status: DISCONTINUED | OUTPATIENT
Start: 2019-10-15 | End: 2019-10-16

## 2019-10-15 RX ORDER — PALIPERIDONE 1.5 MG/1
9 TABLET, EXTENDED RELEASE ORAL AT BEDTIME
Refills: 0 | Status: DISCONTINUED | OUTPATIENT
Start: 2019-10-15 | End: 2019-10-24

## 2019-10-15 RX ADMIN — HALOPERIDOL DECANOATE 5 MILLIGRAM(S): 100 INJECTION INTRAMUSCULAR at 20:44

## 2019-10-15 RX ADMIN — Medication 2 MILLIGRAM(S): at 10:24

## 2019-10-15 RX ADMIN — HALOPERIDOL DECANOATE 5 MILLIGRAM(S): 100 INJECTION INTRAMUSCULAR at 10:23

## 2019-10-15 RX ADMIN — PALIPERIDONE 9 MILLIGRAM(S): 1.5 TABLET, EXTENDED RELEASE ORAL at 20:44

## 2019-10-15 RX ADMIN — Medication 2 MILLIGRAM(S): at 20:44

## 2019-10-15 RX ADMIN — LITHIUM CARBONATE 600 MILLIGRAM(S): 300 TABLET, EXTENDED RELEASE ORAL at 20:44

## 2019-10-16 RX ORDER — LITHIUM CARBONATE 300 MG/1
900 TABLET, EXTENDED RELEASE ORAL AT BEDTIME
Refills: 0 | Status: DISCONTINUED | OUTPATIENT
Start: 2019-10-16 | End: 2019-10-24

## 2019-10-16 RX ADMIN — HALOPERIDOL DECANOATE 5 MILLIGRAM(S): 100 INJECTION INTRAMUSCULAR at 09:07

## 2019-10-16 RX ADMIN — Medication 2 MILLIGRAM(S): at 09:07

## 2019-10-16 RX ADMIN — PALIPERIDONE 9 MILLIGRAM(S): 1.5 TABLET, EXTENDED RELEASE ORAL at 20:21

## 2019-10-16 RX ADMIN — LITHIUM CARBONATE 900 MILLIGRAM(S): 300 TABLET, EXTENDED RELEASE ORAL at 20:21

## 2019-10-16 RX ADMIN — HALOPERIDOL DECANOATE 5 MILLIGRAM(S): 100 INJECTION INTRAMUSCULAR at 20:21

## 2019-10-16 RX ADMIN — Medication 2 MILLIGRAM(S): at 20:21

## 2019-10-17 RX ADMIN — PALIPERIDONE 9 MILLIGRAM(S): 1.5 TABLET, EXTENDED RELEASE ORAL at 20:14

## 2019-10-17 RX ADMIN — HALOPERIDOL DECANOATE 5 MILLIGRAM(S): 100 INJECTION INTRAMUSCULAR at 11:20

## 2019-10-17 RX ADMIN — Medication 2 MILLIGRAM(S): at 11:20

## 2019-10-17 RX ADMIN — LITHIUM CARBONATE 900 MILLIGRAM(S): 300 TABLET, EXTENDED RELEASE ORAL at 20:14

## 2019-10-18 RX ORDER — LANOLIN ALCOHOL/MO/W.PET/CERES
3 CREAM (GRAM) TOPICAL AT BEDTIME
Refills: 0 | Status: DISCONTINUED | OUTPATIENT
Start: 2019-10-18 | End: 2019-10-24

## 2019-10-18 RX ORDER — PALIPERIDONE 1.5 MG/1
234 TABLET, EXTENDED RELEASE ORAL ONCE
Refills: 0 | Status: COMPLETED | OUTPATIENT
Start: 2019-10-19 | End: 2019-10-19

## 2019-10-18 RX ADMIN — Medication 3 MILLIGRAM(S): at 21:27

## 2019-10-18 RX ADMIN — PALIPERIDONE 9 MILLIGRAM(S): 1.5 TABLET, EXTENDED RELEASE ORAL at 20:15

## 2019-10-18 RX ADMIN — LITHIUM CARBONATE 900 MILLIGRAM(S): 300 TABLET, EXTENDED RELEASE ORAL at 20:15

## 2019-10-19 RX ADMIN — PALIPERIDONE 9 MILLIGRAM(S): 1.5 TABLET, EXTENDED RELEASE ORAL at 20:43

## 2019-10-19 RX ADMIN — PALIPERIDONE 234 MILLIGRAM(S): 1.5 TABLET, EXTENDED RELEASE ORAL at 09:15

## 2019-10-19 RX ADMIN — Medication 3 MILLIGRAM(S): at 20:44

## 2019-10-19 RX ADMIN — LITHIUM CARBONATE 900 MILLIGRAM(S): 300 TABLET, EXTENDED RELEASE ORAL at 20:43

## 2019-10-20 VITALS — TEMPERATURE: 97 F

## 2019-10-20 RX ADMIN — Medication 2 MILLIGRAM(S): at 20:56

## 2019-10-20 RX ADMIN — Medication 3 MILLIGRAM(S): at 20:56

## 2019-10-20 RX ADMIN — PALIPERIDONE 9 MILLIGRAM(S): 1.5 TABLET, EXTENDED RELEASE ORAL at 20:54

## 2019-10-20 RX ADMIN — LITHIUM CARBONATE 900 MILLIGRAM(S): 300 TABLET, EXTENDED RELEASE ORAL at 20:54

## 2019-10-21 PROCEDURE — 99232 SBSQ HOSP IP/OBS MODERATE 35: CPT

## 2019-10-21 RX ADMIN — PALIPERIDONE 9 MILLIGRAM(S): 1.5 TABLET, EXTENDED RELEASE ORAL at 20:36

## 2019-10-21 RX ADMIN — LITHIUM CARBONATE 900 MILLIGRAM(S): 300 TABLET, EXTENDED RELEASE ORAL at 20:36

## 2019-10-21 RX ADMIN — Medication 3 MILLIGRAM(S): at 20:37

## 2019-10-22 LAB
HBA1C BLD-MCNC: 5.2 % — SIGNIFICANT CHANGE UP (ref 4–5.6)
LITHIUM SERPL-MCNC: 0.78 MMOL/L — SIGNIFICANT CHANGE UP (ref 0.6–1.2)

## 2019-10-22 PROCEDURE — 99232 SBSQ HOSP IP/OBS MODERATE 35: CPT

## 2019-10-22 RX ADMIN — PALIPERIDONE 9 MILLIGRAM(S): 1.5 TABLET, EXTENDED RELEASE ORAL at 20:40

## 2019-10-22 RX ADMIN — Medication 3 MILLIGRAM(S): at 20:40

## 2019-10-22 RX ADMIN — LITHIUM CARBONATE 900 MILLIGRAM(S): 300 TABLET, EXTENDED RELEASE ORAL at 20:40

## 2019-10-23 PROCEDURE — 99233 SBSQ HOSP IP/OBS HIGH 50: CPT

## 2019-10-23 RX ORDER — PALIPERIDONE 1.5 MG/1
156 TABLET, EXTENDED RELEASE ORAL ONCE
Refills: 0 | Status: COMPLETED | OUTPATIENT
Start: 2019-10-24 | End: 2019-10-24

## 2019-10-23 RX ADMIN — Medication 3 MILLIGRAM(S): at 20:29

## 2019-10-23 RX ADMIN — PALIPERIDONE 9 MILLIGRAM(S): 1.5 TABLET, EXTENDED RELEASE ORAL at 20:29

## 2019-10-23 RX ADMIN — LITHIUM CARBONATE 900 MILLIGRAM(S): 300 TABLET, EXTENDED RELEASE ORAL at 20:29

## 2019-10-24 RX ORDER — PALIPERIDONE 1.5 MG/1
156 TABLET, EXTENDED RELEASE ORAL
Qty: 1 | Refills: 0
Start: 2019-10-24 | End: 2019-10-24

## 2019-10-24 RX ORDER — LITHIUM CARBONATE 300 MG/1
3 TABLET, EXTENDED RELEASE ORAL
Qty: 45 | Refills: 0
Start: 2019-10-24 | End: 2019-11-07

## 2019-10-24 RX ADMIN — PALIPERIDONE 156 MILLIGRAM(S): 1.5 TABLET, EXTENDED RELEASE ORAL at 12:32

## 2020-11-29 ENCOUNTER — INPATIENT (INPATIENT)
Facility: HOSPITAL | Age: 25
LOS: 9 days | Discharge: HOME | End: 2020-12-09
Attending: PSYCHIATRY & NEUROLOGY | Admitting: PSYCHIATRY & NEUROLOGY
Payer: MEDICAID

## 2020-11-29 VITALS
TEMPERATURE: 98 F | OXYGEN SATURATION: 96 % | HEIGHT: 66 IN | HEART RATE: 75 BPM | RESPIRATION RATE: 18 BRPM | DIASTOLIC BLOOD PRESSURE: 92 MMHG | SYSTOLIC BLOOD PRESSURE: 142 MMHG

## 2020-11-29 DIAGNOSIS — F25.9 SCHIZOAFFECTIVE DISORDER, UNSPECIFIED: ICD-10-CM

## 2020-11-29 PROBLEM — F20.9 SCHIZOPHRENIA, UNSPECIFIED: Chronic | Status: ACTIVE | Noted: 2019-10-02

## 2020-11-29 LAB
ALBUMIN SERPL ELPH-MCNC: 4.8 G/DL — SIGNIFICANT CHANGE UP (ref 3.3–5)
ALP SERPL-CCNC: 81 U/L — SIGNIFICANT CHANGE UP (ref 40–120)
ALT FLD-CCNC: 22 U/L — SIGNIFICANT CHANGE UP (ref 4–41)
AMPHET UR-MCNC: NEGATIVE — SIGNIFICANT CHANGE UP
ANION GAP SERPL CALC-SCNC: 15 MMO/L — HIGH (ref 7–14)
ANISOCYTOSIS BLD QL: SLIGHT — SIGNIFICANT CHANGE UP
APAP SERPL-MCNC: < 15 UG/ML — LOW (ref 15–25)
APPEARANCE UR: CLEAR — SIGNIFICANT CHANGE UP
AST SERPL-CCNC: 32 U/L — SIGNIFICANT CHANGE UP (ref 4–40)
BACTERIA # UR AUTO: NEGATIVE — SIGNIFICANT CHANGE UP
BARBITURATES UR SCN-MCNC: NEGATIVE — SIGNIFICANT CHANGE UP
BASOPHILS # BLD AUTO: 0.04 K/UL — SIGNIFICANT CHANGE UP (ref 0–0.2)
BASOPHILS NFR BLD AUTO: 0.5 % — SIGNIFICANT CHANGE UP (ref 0–2)
BASOPHILS NFR SPEC: 0 % — SIGNIFICANT CHANGE UP (ref 0–2)
BENZODIAZ UR-MCNC: NEGATIVE — SIGNIFICANT CHANGE UP
BILIRUB SERPL-MCNC: 0.6 MG/DL — SIGNIFICANT CHANGE UP (ref 0.2–1.2)
BILIRUB UR-MCNC: NEGATIVE — SIGNIFICANT CHANGE UP
BLOOD UR QL VISUAL: NEGATIVE — SIGNIFICANT CHANGE UP
BUN SERPL-MCNC: 13 MG/DL — SIGNIFICANT CHANGE UP (ref 7–23)
CALCIUM SERPL-MCNC: 9.9 MG/DL — SIGNIFICANT CHANGE UP (ref 8.4–10.5)
CANNABINOIDS UR-MCNC: POSITIVE — SIGNIFICANT CHANGE UP
CHLORIDE SERPL-SCNC: 96 MMOL/L — LOW (ref 98–107)
CO2 SERPL-SCNC: 24 MMOL/L — SIGNIFICANT CHANGE UP (ref 22–31)
COCAINE METAB.OTHER UR-MCNC: NEGATIVE — SIGNIFICANT CHANGE UP
COLOR SPEC: YELLOW — SIGNIFICANT CHANGE UP
CREAT SERPL-MCNC: 0.98 MG/DL — SIGNIFICANT CHANGE UP (ref 0.5–1.3)
EOSINOPHIL # BLD AUTO: 0.13 K/UL — SIGNIFICANT CHANGE UP (ref 0–0.5)
EOSINOPHIL NFR BLD AUTO: 1.8 % — SIGNIFICANT CHANGE UP (ref 0–6)
EOSINOPHIL NFR FLD: 2.6 % — SIGNIFICANT CHANGE UP (ref 0–6)
ETHANOL BLD-MCNC: < 10 MG/DL — SIGNIFICANT CHANGE UP
GIANT PLATELETS BLD QL SMEAR: PRESENT — SIGNIFICANT CHANGE UP
GLUCOSE SERPL-MCNC: 93 MG/DL — SIGNIFICANT CHANGE UP (ref 70–99)
GLUCOSE UR-MCNC: NEGATIVE — SIGNIFICANT CHANGE UP
HCT VFR BLD CALC: 48.8 % — SIGNIFICANT CHANGE UP (ref 39–50)
HGB BLD-MCNC: 15.2 G/DL — SIGNIFICANT CHANGE UP (ref 13–17)
HYALINE CASTS # UR AUTO: HIGH
IMM GRANULOCYTES NFR BLD AUTO: 0.3 % — SIGNIFICANT CHANGE UP (ref 0–1.5)
KETONES UR-MCNC: NEGATIVE — SIGNIFICANT CHANGE UP
LEUKOCYTE ESTERASE UR-ACNC: NEGATIVE — SIGNIFICANT CHANGE UP
LYMPHOCYTES # BLD AUTO: 1.97 K/UL — SIGNIFICANT CHANGE UP (ref 1–3.3)
LYMPHOCYTES # BLD AUTO: 26.6 % — SIGNIFICANT CHANGE UP (ref 13–44)
LYMPHOCYTES NFR SPEC AUTO: 19.3 % — SIGNIFICANT CHANGE UP (ref 13–44)
MCHC RBC-ENTMCNC: 22.9 PG — LOW (ref 27–34)
MCHC RBC-ENTMCNC: 31.1 % — LOW (ref 32–36)
MCV RBC AUTO: 73.5 FL — LOW (ref 80–100)
METHADONE UR-MCNC: NEGATIVE — SIGNIFICANT CHANGE UP
MICROCYTES BLD QL: SLIGHT — SIGNIFICANT CHANGE UP
MONOCYTES # BLD AUTO: 0.87 K/UL — SIGNIFICANT CHANGE UP (ref 0–0.9)
MONOCYTES NFR BLD AUTO: 11.8 % — SIGNIFICANT CHANGE UP (ref 2–14)
MONOCYTES NFR BLD: 11.4 % — HIGH (ref 2–9)
NEUTROPHIL AB SER-ACNC: 63.2 % — SIGNIFICANT CHANGE UP (ref 43–77)
NEUTROPHILS # BLD AUTO: 4.37 K/UL — SIGNIFICANT CHANGE UP (ref 1.8–7.4)
NEUTROPHILS NFR BLD AUTO: 59 % — SIGNIFICANT CHANGE UP (ref 43–77)
NITRITE UR-MCNC: NEGATIVE — SIGNIFICANT CHANGE UP
NRBC # FLD: 0 K/UL — SIGNIFICANT CHANGE UP (ref 0–0)
OPIATES UR-MCNC: NEGATIVE — SIGNIFICANT CHANGE UP
OXYCODONE UR-MCNC: NEGATIVE — SIGNIFICANT CHANGE UP
PCP UR-MCNC: NEGATIVE — SIGNIFICANT CHANGE UP
PH UR: 6 — SIGNIFICANT CHANGE UP (ref 5–8)
PLATELET # BLD AUTO: 247 K/UL — SIGNIFICANT CHANGE UP (ref 150–400)
PLATELET COUNT - ESTIMATE: NORMAL — SIGNIFICANT CHANGE UP
PMV BLD: 11.8 FL — SIGNIFICANT CHANGE UP (ref 7–13)
POLYCHROMASIA BLD QL SMEAR: SLIGHT — SIGNIFICANT CHANGE UP
POTASSIUM SERPL-MCNC: 3.3 MMOL/L — LOW (ref 3.5–5.3)
POTASSIUM SERPL-SCNC: 3.3 MMOL/L — LOW (ref 3.5–5.3)
PROT SERPL-MCNC: 8.5 G/DL — HIGH (ref 6–8.3)
PROT UR-MCNC: 20 — SIGNIFICANT CHANGE UP
RBC # BLD: 6.64 M/UL — HIGH (ref 4.2–5.8)
RBC # FLD: 20 % — HIGH (ref 10.3–14.5)
RBC CASTS # UR COMP ASSIST: SIGNIFICANT CHANGE UP (ref 0–?)
REVIEW TO FOLLOW: YES — SIGNIFICANT CHANGE UP
SALICYLATES SERPL-MCNC: < 5 MG/DL — LOW (ref 15–30)
SARS-COV-2 IGG SERPL QL IA: POSITIVE
SARS-COV-2 IGM SERPL IA-ACNC: 33.4 INDEX — HIGH
SARS-COV-2 RNA SPEC QL NAA+PROBE: SIGNIFICANT CHANGE UP
SMUDGE CELLS # BLD: PRESENT — SIGNIFICANT CHANGE UP
SODIUM SERPL-SCNC: 135 MMOL/L — SIGNIFICANT CHANGE UP (ref 135–145)
SP GR SPEC: 1.03 — SIGNIFICANT CHANGE UP (ref 1–1.04)
SQUAMOUS # UR AUTO: SIGNIFICANT CHANGE UP
TARGETS BLD QL SMEAR: SLIGHT — SIGNIFICANT CHANGE UP
TSH SERPL-MCNC: 2.12 UIU/ML — SIGNIFICANT CHANGE UP (ref 0.27–4.2)
UROBILINOGEN FLD QL: NORMAL — SIGNIFICANT CHANGE UP
VARIANT LYMPHS # BLD: 3.5 % — SIGNIFICANT CHANGE UP
WBC # BLD: 7.4 K/UL — SIGNIFICANT CHANGE UP (ref 3.8–10.5)
WBC # FLD AUTO: 7.4 K/UL — SIGNIFICANT CHANGE UP (ref 3.8–10.5)
WBC UR QL: SIGNIFICANT CHANGE UP (ref 0–?)

## 2020-11-29 PROCEDURE — 99285 EMERGENCY DEPT VISIT HI MDM: CPT

## 2020-11-29 RX ORDER — RISPERIDONE 4 MG/1
1 TABLET ORAL
Refills: 0 | Status: DISCONTINUED | OUTPATIENT
Start: 2020-11-29 | End: 2020-12-02

## 2020-11-29 RX ORDER — TETANUS TOXOID, REDUCED DIPHTHERIA TOXOID AND ACELLULAR PERTUSSIS VACCINE, ADSORBED 5; 2.5; 8; 8; 2.5 [IU]/.5ML; [IU]/.5ML; UG/.5ML; UG/.5ML; UG/.5ML
0.5 SUSPENSION INTRAMUSCULAR ONCE
Refills: 0 | Status: COMPLETED | OUTPATIENT
Start: 2020-11-29 | End: 2020-11-29

## 2020-11-29 RX ORDER — LITHIUM CARBONATE 300 MG/1
300 TABLET, EXTENDED RELEASE ORAL
Refills: 0 | Status: DISCONTINUED | OUTPATIENT
Start: 2020-11-29 | End: 2020-11-30

## 2020-11-29 RX ORDER — HALOPERIDOL DECANOATE 100 MG/ML
5 INJECTION INTRAMUSCULAR ONCE
Refills: 0 | Status: COMPLETED | OUTPATIENT
Start: 2020-11-29 | End: 2020-11-29

## 2020-11-29 RX ORDER — HALOPERIDOL DECANOATE 100 MG/ML
5 INJECTION INTRAMUSCULAR ONCE
Refills: 0 | Status: DISCONTINUED | OUTPATIENT
Start: 2020-11-29 | End: 2020-11-29

## 2020-11-29 RX ORDER — HALOPERIDOL DECANOATE 100 MG/ML
5 INJECTION INTRAMUSCULAR EVERY 6 HOURS
Refills: 0 | Status: DISCONTINUED | OUTPATIENT
Start: 2020-11-29 | End: 2020-12-09

## 2020-11-29 RX ADMIN — TETANUS TOXOID, REDUCED DIPHTHERIA TOXOID AND ACELLULAR PERTUSSIS VACCINE, ADSORBED 0.5 MILLILITER(S): 5; 2.5; 8; 8; 2.5 SUSPENSION INTRAMUSCULAR at 05:22

## 2020-11-29 RX ADMIN — HALOPERIDOL DECANOATE 5 MILLIGRAM(S): 100 INJECTION INTRAMUSCULAR at 04:56

## 2020-11-29 RX ADMIN — LITHIUM CARBONATE 300 MILLIGRAM(S): 300 TABLET, EXTENDED RELEASE ORAL at 20:06

## 2020-11-29 RX ADMIN — Medication 2 MILLIGRAM(S): at 04:56

## 2020-11-29 RX ADMIN — RISPERIDONE 1 MILLIGRAM(S): 4 TABLET ORAL at 20:06
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
## 2020-11-29 NOTE — ED PROVIDER NOTE - PROGRESS NOTE DETAILS
Pt seen and eval'd by Psych. They will admit pt to [facility] for his condition. Labs unremarkable except for POS cannabinoids.

## 2020-11-29 NOTE — ED BEHAVIORAL HEALTH ASSESSMENT NOTE - DETAILS
pt not cooperative with interview, per chart no prior hx of suicidal ideation / suicidal attempt see ED MD note MARITZA to be notified once covid returns discussed with mother
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
## 2020-11-29 NOTE — ED BEHAVIORAL HEALTH ASSESSMENT NOTE - SUICIDE RISK FACTORS
Psychotic disorder current/past/Impulsivity/Unable to engage in safety planning/Current mood episode/Agitation/Severe Anxiety/Panic
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
## 2020-11-29 NOTE — ED ADULT NURSE REASSESSMENT NOTE - NS ED NURSE REASSESS COMMENT FT1
Received report from night RN pt calm denies si/hi/avh presently pt made aware of admission to [facility] 4 transported via ems.

## 2020-11-29 NOTE — ED BEHAVIORAL HEALTH ASSESSMENT NOTE - DESCRIPTION
uncooperative and agitated at time of presentation, received haldol 5mg IM/ ativan 2mg IM    Vital Signs Last 24 Hrs  T(C): 36.7 (29 Nov 2020 03:53), Max: 36.7 (29 Nov 2020 03:53)  T(F): 98 (29 Nov 2020 03:53), Max: 98 (29 Nov 2020 03:53)  HR: 75 (29 Nov 2020 03:53) (75 - 75)  BP: 142/92 (29 Nov 2020 03:53) (142/92 - 142/92)  BP(mean): --  RR: 18 (29 Nov 2020 03:53) (18 - 18)  SpO2: 96% (29 Nov 2020 03:53) (96% - 96%) none reported per mom see HPI

## 2020-11-29 NOTE — ED BEHAVIORAL HEALTH ASSESSMENT NOTE - PSYCHIATRIC ISSUES AND PLAN (INCLUDE STANDING AND PRN MEDICATION)
EKG SR no ischemic     CTA: < from: CT Heart with Coronaries (01.23.20 @ 09:11) >  The calculated Agatston calcium score is 821.    Severe (approximately 70%) stenosis of the proximal aspect of the first obtuse marginal branch (OM1).     Mild-moderate (approximately 50%) stenosis of the proximal RCA secondary to mixed plaques    Minimal (less than 25%) to mild (25-50%) stenosis of the left anterior descending, mid to distal RCA, left  circumflex arteries.    Far distal part of the LAD is not well evaluated secondary to very low caliber of the vessel.    < end of copied text >    Assessment and Plan     1) Chest pain: CTA abnormal will plan for C c/w asa load plavix 600 and 75 mg po from tomorrow , lipitor 80     2) HTN c/w lisinopril start metoprolol 25 mg po daily     3) DVT PPX heparin     3)
see formulation
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
## 2020-11-29 NOTE — ED BEHAVIORAL HEALTH ASSESSMENT NOTE - RISK ASSESSMENT
Patient is at elevated risk for harm to others given decompensated psychotic illness, untreated mental illness and recent aggressive behaviour, risk would be mitigated by hospitalization to inpatient unit.    Patient with low acute risk for suicide, he has no prior history of suicide attempt or suicide ideation, and per collateral has not recently made any suicidal statements. Risk assessment should be re-evaluated when patient is more cooperative with the interview. Low Acute Suicide Risk

## 2020-11-29 NOTE — ED BEHAVIORAL HEALTH ASSESSMENT NOTE - OTHER PAST PSYCHIATRIC HISTORY (INCLUDE DETAILS REGARDING ONSET, COURSE OF ILLNESS, INPATIENT/OUTPATIENT TREATMENT)
Per chart: Documented history of psychotic disorder (seen at [facility] ED on 10/21/18 and treated and released); admitted to [facility] from [facility] ED 2019 and past diagnosis of schizophrenia vs schizoaffective disorder

## 2020-11-29 NOTE — ED BEHAVIORAL HEALTH ASSESSMENT NOTE - NS ED BHA MED ROS EYES
Cancer Nurse Navigator Note  Met with pt and his wife, Ksenia, during fu visit with Dr. Moreau.  Tray has completed concurrent chemotherapy and radiation the last week of August. He then had 2 cycles of consolidative chemotherapy.     Overall, pt is doing good, but does c/o fatigue.  He is back to work at [employer] He reports having a good appetite. He has gained some weight back.  He is on abx for a chest wound infection.  He is being monitored by ID for this.  His concern right now is the leads in his right chest from when his Pacemaker was moved.  These need to come out.   FU Ct scan on 1/27. The CT showed decrease in lung mass from prev, cardiomegaly with mod to large pericardial effusion, right pleural effusion. Echo ruled out pericardial effusion. He is waiting on cytology results of pleural fluid.      Pt is still smoking, although he states he has cut down.  Offered strategies for smoking cessation. Pt declined at this time.    Pending the outcome of the cytology, pt will be on surveillance vs need for further work up.      Emotional support provided.  Will continue to monitor and support.  Encouraged Tray and Ksenia to contact me with any questions or concerns.      
No complaints
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
## 2020-11-29 NOTE — ED BEHAVIORAL HEALTH ASSESSMENT NOTE - OTHER
EMT and [employer] (not under arrest) activated by mother CVM intense unstated assessment limited secondary to poor engagement pt does not appear internally preoccupied but assessment limited not assessed

## 2020-11-30 RX ORDER — HALOPERIDOL DECANOATE 100 MG/ML
5 INJECTION INTRAMUSCULAR EVERY 4 HOURS
Refills: 0 | Status: DISCONTINUED | OUTPATIENT
Start: 2020-11-30 | End: 2020-12-09

## 2020-11-30 RX ORDER — DIPHENHYDRAMINE HCL 50 MG
50 CAPSULE ORAL ONCE
Refills: 0 | Status: DISCONTINUED | OUTPATIENT
Start: 2020-11-30 | End: 2020-12-09

## 2020-11-30 RX ORDER — DIPHENHYDRAMINE HCL 50 MG
50 CAPSULE ORAL EVERY 4 HOURS
Refills: 0 | Status: DISCONTINUED | OUTPATIENT
Start: 2020-11-30 | End: 2020-12-09

## 2020-11-30 RX ORDER — LITHIUM CARBONATE 300 MG/1
450 TABLET, EXTENDED RELEASE ORAL
Refills: 0 | Status: DISCONTINUED | OUTPATIENT
Start: 2020-11-30 | End: 2020-12-02

## 2020-11-30 RX ORDER — CLONAZEPAM 1 MG
1 TABLET ORAL
Refills: 0 | Status: DISCONTINUED | OUTPATIENT
Start: 2020-11-30 | End: 2020-12-02

## 2020-11-30 RX ADMIN — RISPERIDONE 1 MILLIGRAM(S): 4 TABLET ORAL at 20:28

## 2020-11-30 RX ADMIN — LITHIUM CARBONATE 300 MILLIGRAM(S): 300 TABLET, EXTENDED RELEASE ORAL at 09:01

## 2020-11-30 RX ADMIN — LITHIUM CARBONATE 450 MILLIGRAM(S): 300 TABLET, EXTENDED RELEASE ORAL at 20:27

## 2020-11-30 RX ADMIN — Medication 50 MILLIGRAM(S): at 22:25

## 2020-11-30 RX ADMIN — Medication 1 MILLIGRAM(S): at 20:27

## 2020-11-30 RX ADMIN — RISPERIDONE 1 MILLIGRAM(S): 4 TABLET ORAL at 09:01

## 2020-12-01 LAB
ALBUMIN SERPL ELPH-MCNC: 4.7 G/DL — SIGNIFICANT CHANGE UP (ref 3.3–5)
ALP SERPL-CCNC: 74 U/L — SIGNIFICANT CHANGE UP (ref 40–120)
ALT FLD-CCNC: 22 U/L — SIGNIFICANT CHANGE UP (ref 4–41)
ANION GAP SERPL CALC-SCNC: 13 MMO/L — SIGNIFICANT CHANGE UP (ref 7–14)
AST SERPL-CCNC: 25 U/L — SIGNIFICANT CHANGE UP (ref 4–40)
BILIRUB SERPL-MCNC: 0.4 MG/DL — SIGNIFICANT CHANGE UP (ref 0.2–1.2)
BUN SERPL-MCNC: 13 MG/DL — SIGNIFICANT CHANGE UP (ref 7–23)
CALCIUM SERPL-MCNC: 9.9 MG/DL — SIGNIFICANT CHANGE UP (ref 8.4–10.5)
CHLORIDE SERPL-SCNC: 95 MMOL/L — LOW (ref 98–107)
CHOLEST SERPL-MCNC: 157 MG/DL — SIGNIFICANT CHANGE UP (ref 120–199)
CO2 SERPL-SCNC: 27 MMOL/L — SIGNIFICANT CHANGE UP (ref 22–31)
CREAT SERPL-MCNC: 0.89 MG/DL — SIGNIFICANT CHANGE UP (ref 0.5–1.3)
DIRECT LDL: 116 MG/DL — SIGNIFICANT CHANGE UP
GLUCOSE SERPL-MCNC: 83 MG/DL — SIGNIFICANT CHANGE UP (ref 70–99)
HBA1C BLD-MCNC: 5.4 % — SIGNIFICANT CHANGE UP (ref 4–5.6)
HDLC SERPL-MCNC: 36 MG/DL — SIGNIFICANT CHANGE UP (ref 35–55)
MAGNESIUM SERPL-MCNC: 2.3 MG/DL — SIGNIFICANT CHANGE UP (ref 1.6–2.6)
PHOSPHATE SERPL-MCNC: 2.8 MG/DL — SIGNIFICANT CHANGE UP (ref 2.5–4.5)
POTASSIUM SERPL-MCNC: 3.3 MMOL/L — LOW (ref 3.5–5.3)
POTASSIUM SERPL-SCNC: 3.3 MMOL/L — LOW (ref 3.5–5.3)
PROT SERPL-MCNC: 8 G/DL — SIGNIFICANT CHANGE UP (ref 6–8.3)
SARS-COV-2 IGG SERPL IA-ACNC: 2.4 RATIO — HIGH
SARS-COV-2 IGG SERPL QL IA: POSITIVE
SARS-COV-2 IGG SERPL QL IA: POSITIVE
SARS-COV-2 IGM SERPL IA-ACNC: 2.6 RATIO — HIGH
SODIUM SERPL-SCNC: 135 MMOL/L — SIGNIFICANT CHANGE UP (ref 135–145)
TRIGL SERPL-MCNC: 106 MG/DL — SIGNIFICANT CHANGE UP (ref 10–149)

## 2020-12-01 PROCEDURE — 99232 SBSQ HOSP IP/OBS MODERATE 35: CPT

## 2020-12-01 RX ADMIN — LITHIUM CARBONATE 450 MILLIGRAM(S): 300 TABLET, EXTENDED RELEASE ORAL at 09:06

## 2020-12-01 RX ADMIN — LITHIUM CARBONATE 450 MILLIGRAM(S): 300 TABLET, EXTENDED RELEASE ORAL at 20:15

## 2020-12-01 RX ADMIN — Medication 50 MILLIGRAM(S): at 22:53

## 2020-12-01 RX ADMIN — Medication 1 MILLIGRAM(S): at 09:06

## 2020-12-01 RX ADMIN — RISPERIDONE 1 MILLIGRAM(S): 4 TABLET ORAL at 09:06

## 2020-12-01 RX ADMIN — RISPERIDONE 1 MILLIGRAM(S): 4 TABLET ORAL at 20:15

## 2020-12-01 RX ADMIN — Medication 1 MILLIGRAM(S): at 20:15

## 2020-12-02 PROCEDURE — 99232 SBSQ HOSP IP/OBS MODERATE 35: CPT

## 2020-12-02 RX ORDER — LITHIUM CARBONATE 300 MG/1
900 TABLET, EXTENDED RELEASE ORAL AT BEDTIME
Refills: 0 | Status: DISCONTINUED | OUTPATIENT
Start: 2020-12-03 | End: 2020-12-03

## 2020-12-02 RX ORDER — RISPERIDONE 4 MG/1
3 TABLET ORAL AT BEDTIME
Refills: 0 | Status: DISCONTINUED | OUTPATIENT
Start: 2020-12-02 | End: 2020-12-09

## 2020-12-02 RX ORDER — CLONAZEPAM 1 MG
0.5 TABLET ORAL
Refills: 0 | Status: DISCONTINUED | OUTPATIENT
Start: 2020-12-02 | End: 2020-12-03

## 2020-12-02 RX ORDER — LITHIUM CARBONATE 300 MG/1
450 TABLET, EXTENDED RELEASE ORAL ONCE
Refills: 0 | Status: COMPLETED | OUTPATIENT
Start: 2020-12-02 | End: 2020-12-02

## 2020-12-02 RX ADMIN — Medication 0.5 MILLIGRAM(S): at 20:29

## 2020-12-02 RX ADMIN — RISPERIDONE 3 MILLIGRAM(S): 4 TABLET ORAL at 20:29

## 2020-12-02 RX ADMIN — Medication 1 MILLIGRAM(S): at 08:16

## 2020-12-02 RX ADMIN — LITHIUM CARBONATE 450 MILLIGRAM(S): 300 TABLET, EXTENDED RELEASE ORAL at 08:16

## 2020-12-02 RX ADMIN — RISPERIDONE 1 MILLIGRAM(S): 4 TABLET ORAL at 08:16

## 2020-12-02 RX ADMIN — LITHIUM CARBONATE 450 MILLIGRAM(S): 300 TABLET, EXTENDED RELEASE ORAL at 20:29

## 2020-12-03 PROCEDURE — 99232 SBSQ HOSP IP/OBS MODERATE 35: CPT

## 2020-12-03 RX ORDER — LITHIUM CARBONATE 300 MG/1
1200 TABLET, EXTENDED RELEASE ORAL AT BEDTIME
Refills: 0 | Status: DISCONTINUED | OUTPATIENT
Start: 2020-12-03 | End: 2020-12-09

## 2020-12-03 RX ADMIN — RISPERIDONE 3 MILLIGRAM(S): 4 TABLET ORAL at 20:17

## 2020-12-03 RX ADMIN — LITHIUM CARBONATE 1200 MILLIGRAM(S): 300 TABLET, EXTENDED RELEASE ORAL at 20:17

## 2020-12-04 LAB — LITHIUM SERPL-MCNC: 1.03 MMOL/L — SIGNIFICANT CHANGE UP (ref 0.6–1.2)

## 2020-12-04 PROCEDURE — 99232 SBSQ HOSP IP/OBS MODERATE 35: CPT

## 2020-12-04 RX ADMIN — LITHIUM CARBONATE 1200 MILLIGRAM(S): 300 TABLET, EXTENDED RELEASE ORAL at 20:17

## 2020-12-04 RX ADMIN — Medication 50 MILLIGRAM(S): at 20:18

## 2020-12-04 RX ADMIN — RISPERIDONE 3 MILLIGRAM(S): 4 TABLET ORAL at 20:18

## 2020-12-05 PROCEDURE — 99232 SBSQ HOSP IP/OBS MODERATE 35: CPT

## 2020-12-05 RX ADMIN — Medication 2 MILLIGRAM(S): at 12:48

## 2020-12-05 RX ADMIN — RISPERIDONE 3 MILLIGRAM(S): 4 TABLET ORAL at 20:49

## 2020-12-05 RX ADMIN — LITHIUM CARBONATE 1200 MILLIGRAM(S): 300 TABLET, EXTENDED RELEASE ORAL at 20:49

## 2020-12-06 RX ADMIN — RISPERIDONE 3 MILLIGRAM(S): 4 TABLET ORAL at 20:12

## 2020-12-06 RX ADMIN — LITHIUM CARBONATE 1200 MILLIGRAM(S): 300 TABLET, EXTENDED RELEASE ORAL at 20:12

## 2020-12-07 PROCEDURE — 99232 SBSQ HOSP IP/OBS MODERATE 35: CPT

## 2020-12-07 RX ORDER — PALIPERIDONE 1.5 MG/1
234 TABLET, EXTENDED RELEASE ORAL ONCE
Refills: 0 | Status: COMPLETED | OUTPATIENT
Start: 2020-12-08 | End: 2020-12-08

## 2020-12-07 RX ADMIN — RISPERIDONE 3 MILLIGRAM(S): 4 TABLET ORAL at 20:20

## 2020-12-07 RX ADMIN — LITHIUM CARBONATE 1200 MILLIGRAM(S): 300 TABLET, EXTENDED RELEASE ORAL at 20:20

## 2020-12-08 PROCEDURE — 99232 SBSQ HOSP IP/OBS MODERATE 35: CPT

## 2020-12-08 RX ORDER — ACETAMINOPHEN 500 MG
650 TABLET ORAL ONCE
Refills: 0 | Status: COMPLETED | OUTPATIENT
Start: 2020-12-08 | End: 2020-12-08

## 2020-12-08 RX ORDER — LITHIUM CARBONATE 300 MG/1
4 TABLET, EXTENDED RELEASE ORAL
Qty: 56 | Refills: 0
Start: 2020-12-08 | End: 2020-12-21

## 2020-12-08 RX ADMIN — Medication 650 MILLIGRAM(S): at 17:00

## 2020-12-08 RX ADMIN — Medication 650 MILLIGRAM(S): at 16:14

## 2020-12-08 RX ADMIN — RISPERIDONE 3 MILLIGRAM(S): 4 TABLET ORAL at 22:04

## 2020-12-08 RX ADMIN — PALIPERIDONE 234 MILLIGRAM(S): 1.5 TABLET, EXTENDED RELEASE ORAL at 11:25

## 2020-12-08 RX ADMIN — LITHIUM CARBONATE 1200 MILLIGRAM(S): 300 TABLET, EXTENDED RELEASE ORAL at 22:04

## 2020-12-08 NOTE — BH INPATIENT PSYCHIATRY PROGRESS NOTE - NSBHADMITIPREASONDETAILS_PSY_A_CORE FT
The pt. continues to demonstrate good insight, commitment to outpatient treatment and displays no overt Sx of psychosis. It is possible that he may harbor or minimize some irritability, but remains in good control and is not an acute risk of harm to others or himself. He is appropriate for further management outpatient. Medication compliant. C/O of H/A; admits he has not been keeping hydrated as advised. Will reassess after hydration and Tylenol.

## 2020-12-08 NOTE — BH INPATIENT PSYCHIATRY PROGRESS NOTE - NSBHFUPINTERVALHXFT_PSY_A_CORE
Follow-up for schizoaffective disorder. Chart reviewed and discussed with team. No events reported overnight. Pt. is sleeping and eating normally. He reports that he has had a H/A since last night and believes it is in response to the lithium dose. The patient admits that he has not been drinking enough water as previously advised. He was advised to take a pain reliever, hydrate and will reassess his H/A later today. He denies further SE; Sx of lithium toxicity not assessed. Pt. denied AVH and delusions, SI/HI intent or plan. Medication compliant. No SE noted.

## 2020-12-09 VITALS — SYSTOLIC BLOOD PRESSURE: 126 MMHG | TEMPERATURE: 98 F | DIASTOLIC BLOOD PRESSURE: 78 MMHG
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
## 2020-12-09 NOTE — BH DISCHARGE NOTE NURSING/SOCIAL WORK/PSYCH REHAB - NSDCPRRECOMMEND_PSY_ALL_CORE
Pt will follow up treatment with [facility].  Pt will explore Career development service for additional vocational support.

## 2020-12-09 NOTE — BH INPATIENT PSYCHIATRY PROGRESS NOTE - CURRENT MEDICATION
MEDICATIONS  (STANDING):  lithium SR (LITHOBID) 1200 milliGRAM(s) Oral at bedtime  risperiDONE   Tablet 3 milliGRAM(s) Oral at bedtime    MEDICATIONS  (PRN):  diphenhydrAMINE 50 milliGRAM(s) Oral every 4 hours PRN Agitation/EPS  diphenhydrAMINE   Injectable 50 milliGRAM(s) IntraMuscular once PRN Agitation/EPS  haloperidol     Tablet 5 milliGRAM(s) Oral every 4 hours PRN agitation  haloperidol    Injectable 5 milliGRAM(s) IntraMuscular every 6 hours PRN psychotic agitation  LORazepam     Tablet 2 milliGRAM(s) Oral every 4 hours PRN psychotic agitation  LORazepam   Injectable 2 milliGRAM(s) IntraMuscular once PRN Agitation  
MEDICATIONS  (STANDING):  acetaminophen   Tablet .. 650 milliGRAM(s) Oral once  lithium SR (LITHOBID) 1200 milliGRAM(s) Oral at bedtime  risperiDONE   Tablet 3 milliGRAM(s) Oral at bedtime    MEDICATIONS  (PRN):  diphenhydrAMINE 50 milliGRAM(s) Oral every 4 hours PRN Agitation/EPS  diphenhydrAMINE   Injectable 50 milliGRAM(s) IntraMuscular once PRN Agitation/EPS  haloperidol     Tablet 5 milliGRAM(s) Oral every 4 hours PRN agitation  haloperidol    Injectable 5 milliGRAM(s) IntraMuscular every 6 hours PRN psychotic agitation  LORazepam     Tablet 2 milliGRAM(s) Oral every 4 hours PRN psychotic agitation  LORazepam   Injectable 2 milliGRAM(s) IntraMuscular once PRN Agitation

## 2020-12-09 NOTE — BH INPATIENT PSYCHIATRY PROGRESS NOTE - NSBHMETABOLIC_PSY_ALL_CORE_FT
BMI:   HbA1c:   Glucose: POCT Blood Glucose.: 100 mg/dL (11-29-20 @ 04:15)    BP: 126/78 (12-09-20 @ 08:14) (126/78 - 137/65)  Lipid Panel: Date/Time: 12-01-20 @ 08:41  Cholesterol, Serum: 157  Direct LDL: 116  HDL Cholesterol, Serum: 36  Total Cholesterol/HDL Ration Measurement: --  Triglycerides, Serum: 106  
BMI:   HbA1c:   Glucose: POCT Blood Glucose.: 100 mg/dL (11-29-20 @ 04:15)    BP: 137/65 (12-08-20 @ 08:17) (137/65 - 137/65)  Lipid Panel: Date/Time: 12-01-20 @ 08:41  Cholesterol, Serum: 157  Direct LDL: 116  HDL Cholesterol, Serum: 36  Total Cholesterol/HDL Ration Measurement: --  Triglycerides, Serum: 106

## 2020-12-09 NOTE — BH DISCHARGE NOTE NURSING/SOCIAL WORK/PSYCH REHAB - NSBHREFERPURPOSE2_PSY_ALL_CORE
This appointment is to set you up so that you can get your injection on 12/26/Other.../Mental Health Treatment

## 2020-12-09 NOTE — BH INPATIENT PSYCHIATRY PROGRESS NOTE - NSBHMSESPEECH_PSY_A_CORE
Normal volume, rate, productivity, spontaneity and articulation
Normal volume, rate, productivity, spontaneity and articulation

## 2020-12-09 NOTE — BH INPATIENT PSYCHIATRY PROGRESS NOTE - NSCGIIMPROVESX_PSY_ALL_CORE
1 - Very much improved - nearly all better; good level of functioning; minimal symptoms; represents a very substantial change

## 2020-12-09 NOTE — BH INPATIENT PSYCHIATRY PROGRESS NOTE - NSTREATMENTCERTY_PSY_ALL_CORE
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
## 2020-12-09 NOTE — BH DISCHARGE NOTE NURSING/SOCIAL WORK/PSYCH REHAB - NSDCINSTRUCTIONS_PSY_ALL_CORE_FT
When discharged, take only medications prescribed as instructed by your hospital provider    Do not stop or change any medications until you discuss changes with your own prescriber    Do not take any other medications, including left over medications from before your admission, over the counter medications or herbal supplements, unless you discuss with your own provider
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
## 2020-12-09 NOTE — BH INPATIENT PSYCHIATRY PROGRESS NOTE - PRN MEDS
MEDICATIONS  (PRN):  diphenhydrAMINE 50 milliGRAM(s) Oral every 4 hours PRN Agitation/EPS  diphenhydrAMINE   Injectable 50 milliGRAM(s) IntraMuscular once PRN Agitation/EPS  haloperidol     Tablet 5 milliGRAM(s) Oral every 4 hours PRN agitation  haloperidol    Injectable 5 milliGRAM(s) IntraMuscular every 6 hours PRN psychotic agitation  LORazepam     Tablet 2 milliGRAM(s) Oral every 4 hours PRN psychotic agitation  LORazepam   Injectable 2 milliGRAM(s) IntraMuscular once PRN Agitation  
MEDICATIONS  (PRN):  diphenhydrAMINE 50 milliGRAM(s) Oral every 4 hours PRN Agitation/EPS  diphenhydrAMINE   Injectable 50 milliGRAM(s) IntraMuscular once PRN Agitation/EPS  haloperidol     Tablet 5 milliGRAM(s) Oral every 4 hours PRN agitation  haloperidol    Injectable 5 milliGRAM(s) IntraMuscular every 6 hours PRN psychotic agitation  LORazepam     Tablet 2 milliGRAM(s) Oral every 4 hours PRN psychotic agitation  LORazepam   Injectable 2 milliGRAM(s) IntraMuscular once PRN Agitation

## 2020-12-09 NOTE — BH INPATIENT PSYCHIATRY DISCHARGE NOTE - NSBHDCRISKMITIGATEFT_PSY_ALL_CORE
Suicidal and aggression risk assessments were performed on the day of discharge. The patient is at elevated chronic risk of self-harm due to an underlying mood/psychotic disorder. He is not actively suicidal or manic, making him appropriate for less restrictive treatment as an outpatient without need for continued inpatient hospitalization. Protective factors for suicide include future orientation, social support, treatment engagement, med compliance. Risk factors include access, insomnia, impulsivity.      Immediate risk was minimized by inpatient admission to a safe environment with appropriate supervision and limited access to lethal means. Future risk was minimized before discharge by treatment of anxiety/depressive symptoms, maximizing outpatient support, providing relevant patient education, discussing emergency procedures, and ensuring close follow-up. Patient denies all suicidal and aggressive/homicidal ideation, intent and plan, and, in view of demonstrated clinical improvement, is not judged to be an acute danger to self or others at this time. Although the patient remains at their chronic baseline risk of self-harm, such risk cannot be further ameliorated by continued inpatient treatment and the patient is therefore appropriate for discharge.

## 2020-12-09 NOTE — BH INPATIENT PSYCHIATRY DISCHARGE NOTE - NSBHDCRISKMITIGATE_PSY_ALL_CORE
Family/Other social support involvement/Long acting injectable medication/Medications targeting suicidality/non-suicidal self injurious behavior/Safety planning

## 2020-12-09 NOTE — BH INPATIENT PSYCHIATRY DISCHARGE NOTE - NSBHMETABOLIC_PSY_ALL_CORE_FT
BMI:   HbA1c:   Glucose: POCT Blood Glucose.: 100 mg/dL (11-29-20 @ 04:15)    BP: 126/78 (12-09-20 @ 08:14) (126/78 - 137/65)  Lipid Panel: Date/Time: 12-01-20 @ 08:41  Cholesterol, Serum: 157  Direct LDL: 116  HDL Cholesterol, Serum: 36  Total Cholesterol/HDL Ration Measurement: --  Triglycerides, Serum: 106   BMI: not recorded  HbA1c: 5.4  Glucose: POCT Blood Glucose.: 100 mg/dL (11-29-20 @ 04:15)    BP: 126/78 (12-09-20 @ 08:14) (126/78 - 137/65)  Lipid Panel: Date/Time: 12-01-20 @ 08:41  Cholesterol, Serum: 157  Direct LDL: 116  HDL Cholesterol, Serum: 36  Total Cholesterol/HDL Ration Measurement: --  Triglycerides, Serum: 106

## 2020-12-09 NOTE — BH DISCHARGE NOTE NURSING/SOCIAL WORK/PSYCH REHAB - PATIENT PORTAL LINK FT
You can access the FollowMyHealth Patient Portal offered by [facility] by registering at the following website: [email or web address]. By joining [employer]’s FollowMyHealth portal, you will also be able to view your health information using other applications (apps) compatible with our system.

## 2020-12-09 NOTE — BH INPATIENT PSYCHIATRY PROGRESS NOTE - NSCGISEVERILLNESS_PSY_ALL_CORE
3 = Mildly ill – clearly established symptoms with minimal, if any, distress or difficulty in social and occupational function

## 2020-12-09 NOTE — BH DISCHARGE NOTE NURSING/SOCIAL WORK/PSYCH REHAB - NSDPDISTO_PSY_ALL_CORE
Home/Patient will be going to his cousins house for a few days. He cannot return to his family home. He will then be making his own housing arrangements.

## 2020-12-09 NOTE — BH INPATIENT PSYCHIATRY DISCHARGE NOTE - VIOLENCE RISK FACTORS:
Feeling of being under threat and being unable to control threat/Irritability/Affective dysregulation/Impulsivity
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
## 2020-12-09 NOTE — BH INPATIENT PSYCHIATRY DISCHARGE NOTE - OTHER PAST PSYCHIATRIC HISTORY (INCLUDE DETAILS REGARDING ONSET, COURSE OF ILLNESS, INPATIENT/OUTPATIENT TREATMENT)
Per chart: Documented history of psychotic disorder (seen at [facility] ED on 10/21/18 and treated and released); admitted to [facility] from [facility] ED 2019 and past diagnosis of schizophrenia vs schizoaffective disorder * PPHx of schizophrenia, delusional disorder, cannabis related disorder, at least one prior psychiatric hospitalization ([facility] 2019), no known SA, history of aggression when psychotically decompensated.

## 2020-12-09 NOTE — BH DISCHARGE NOTE NURSING/SOCIAL WORK/PSYCH REHAB - NSDCPRGOAL_PSY_ALL_CORE
Pt made progress on his goals towards his discharge. Pt has demonstrated daily compliant with medication regimen and dosage. Pt has identified benefits of medication compliance such as less AH and mood stabilize during the individual therapy session.      Pt has maintained good behavioral control since his admission. Pt was able ot work on coping skills and early warning signs. Pt denies SI, HI, AH and VH.  Pt has express a need to work on vocational goals in the near futures. Writer provided career development program brochure and reviewed the program with pt. Pt was receptive. Pt showed approximately 30% of group attendance over this past week. During the group session, pt was able to tolerate group structure, participated relevantly with some prompting.     Pt has participated in patient safety and was provided a copy of it. Pt was received a copy of COVID-19 prevention educational handout. Pt was provided with [employer] survey.

## 2020-12-09 NOTE — BH INPATIENT PSYCHIATRY PROGRESS NOTE - NSBHCHARTREVIEWVS_PSY_A_CORE FT
Vital Signs Last 24 Hrs  T(C): 36.6 (09 Dec 2020 08:14), Max: 36.6 (09 Dec 2020 08:14)  T(F): 97.9 (09 Dec 2020 08:14), Max: 97.9 (09 Dec 2020 08:14)  HR: --  BP: 126/78 (09 Dec 2020 08:14) (126/78 - 126/78)  BP(mean): 87 (09 Dec 2020 08:14) (87 - 87)  RR: --  SpO2: --
Vital Signs Last 24 Hrs  T(C): 37 (08 Dec 2020 08:17), Max: 37 (07 Dec 2020 19:49)  T(F): 98.6 (08 Dec 2020 08:17), Max: 98.6 (07 Dec 2020 19:49)  HR: 95 (08 Dec 2020 08:17) (95 - 95)  BP: 137/65 (08 Dec 2020 08:17) (137/65 - 137/65)  BP(mean): --  RR: --  SpO2: --

## 2020-12-09 NOTE — BH INPATIENT PSYCHIATRY PROGRESS NOTE - NSDCCRITERIA_PSY_ALL_CORE
Sx reduction, medication management, safety planning, milieu therapy, psychiatric outpatient therapy
Sx reduction, medication management, safety planning, milieu therapy, psychiatric outpatient therapy

## 2020-12-09 NOTE — BH INPATIENT PSYCHIATRY PROGRESS NOTE - NSBHADMITIPREASONDETAILS_PSY_A_CORE FT
The patient has demonstrated sustained improvement in mood and psychosis. He is aware that he will only be able to stay with his cousin for 2-3 days and is opting to secure additional housing on his own, despite being offered assistance with housing. He will be provided with resources to shelters if necessary. The patient's mother and cousin are both aware of his discharge and instructed on when to call emergency services. The patient is no longer an acute risk of harm to himself or others and is appropriate for discharge today. The patient has demonstrated sustained improvement in mood and psychosis. He is aware that he will only be able to stay with his cousin for 2-3 days and is opting to secure additional housing on his own, despite being offered assistance with housing. He will be provided with resources to shelters if necessary. The patient's mother and cousin are both aware of his discharge and instructed on when to call emergency services. The patient is no longer an acute risk of harm to himself or others and is appropriate for discharge today.    Plan:  D/c on lithium 1200mg  Next Invega Sustenna 156mg loading dose at crisis center on 12/16; plan for 117 mg Q4 weeks  D/C to cousins home

## 2020-12-09 NOTE — BH INPATIENT PSYCHIATRY DISCHARGE NOTE - NSBHDCHANDOFFNOFT_PSY_A_CORE
Appointment still being confirmed. Verbal handoff will be given to [facility] [phone] on 12/15 for bridge appointment on 12/16, and to [facility] on 12/14 for appointment on 12/15 at 10:00AM, including discussion of hospital course, treatment, and medications.      Verbal handoff given to Stone Silverman at [facility] [phone] and to Crisis Center [phone] including discussion of hospital course, treatment, and medications. Bridge appointment at crisis center on 12/16 at 3:45PM and at [facility]  on 12/15 at 10:00AM.

## 2020-12-10 ENCOUNTER — OUTPATIENT (OUTPATIENT)
Dept: OUTPATIENT SERVICES | Facility: HOSPITAL | Age: 25
LOS: 1 days | Discharge: STILL ADMITTED OR ADMINISTRATIVE | End: 2020-12-10

## 2020-12-10 PROCEDURE — 99238 HOSP IP/OBS DSCHRG MGMT 30/<: CPT

## 2020-12-11 DIAGNOSIS — F25.9 SCHIZOAFFECTIVE DISORDER, UNSPECIFIED: ICD-10-CM

## 2020-12-28 RX ORDER — RISPERIDONE 4 MG/1
1 TABLET ORAL
Qty: 30 | Refills: 0
Start: 2020-12-28 | End: 2021-02-04

## 2020-12-28 RX ORDER — LITHIUM CARBONATE 300 MG/1
4 TABLET, EXTENDED RELEASE ORAL
Qty: 120 | Refills: 0
Start: 2020-12-28 | End: 2021-01-26

## 2020-12-28 RX ORDER — RISPERIDONE 4 MG/1
1 TABLET ORAL
Qty: 30 | Refills: 0
Start: 2020-12-28 | End: 2021-01-26

## 2021-06-06 ENCOUNTER — EMERGENCY (EMERGENCY)
Facility: HOSPITAL | Age: 26
LOS: 1 days | Discharge: PSYCHIATRIC OR SUBSTANCE USE FACILITY | End: 2021-06-06
Attending: STUDENT IN AN ORGANIZED HEALTH CARE EDUCATION/TRAINING PROGRAM | Admitting: STUDENT IN AN ORGANIZED HEALTH CARE EDUCATION/TRAINING PROGRAM
Payer: MEDICAID

## 2021-06-06 VITALS
SYSTOLIC BLOOD PRESSURE: 123 MMHG | OXYGEN SATURATION: 100 % | DIASTOLIC BLOOD PRESSURE: 75 MMHG | RESPIRATION RATE: 18 BRPM | HEIGHT: 66 IN | TEMPERATURE: 98 F | HEART RATE: 115 BPM

## 2021-06-06 DIAGNOSIS — F12.10 CANNABIS ABUSE, UNCOMPLICATED: ICD-10-CM

## 2021-06-06 DIAGNOSIS — F25.0 SCHIZOAFFECTIVE DISORDER, BIPOLAR TYPE: ICD-10-CM

## 2021-06-06 LAB
ALBUMIN SERPL ELPH-MCNC: 5 G/DL — SIGNIFICANT CHANGE UP (ref 3.3–5)
ALP SERPL-CCNC: 91 U/L — SIGNIFICANT CHANGE UP (ref 40–120)
ALT FLD-CCNC: 23 U/L — SIGNIFICANT CHANGE UP (ref 4–41)
AMPHET UR-MCNC: NEGATIVE — SIGNIFICANT CHANGE UP
ANION GAP SERPL CALC-SCNC: 17 MMOL/L — HIGH (ref 7–14)
ANISOCYTOSIS BLD QL: SIGNIFICANT CHANGE UP
APAP SERPL-MCNC: <15 UG/ML — SIGNIFICANT CHANGE UP (ref 15–25)
APPEARANCE UR: ABNORMAL
AST SERPL-CCNC: 32 U/L — SIGNIFICANT CHANGE UP (ref 4–40)
B PERT DNA SPEC QL NAA+PROBE: SIGNIFICANT CHANGE UP
BACTERIA # UR AUTO: NEGATIVE — SIGNIFICANT CHANGE UP
BARBITURATES UR SCN-MCNC: NEGATIVE — SIGNIFICANT CHANGE UP
BASOPHILS # BLD AUTO: 0.09 K/UL — SIGNIFICANT CHANGE UP (ref 0–0.2)
BASOPHILS NFR BLD AUTO: 0.9 % — SIGNIFICANT CHANGE UP (ref 0–2)
BENZODIAZ UR-MCNC: NEGATIVE — SIGNIFICANT CHANGE UP
BILIRUB SERPL-MCNC: 0.8 MG/DL — SIGNIFICANT CHANGE UP (ref 0.2–1.2)
BILIRUB UR-MCNC: NEGATIVE — SIGNIFICANT CHANGE UP
BUN SERPL-MCNC: 17 MG/DL — SIGNIFICANT CHANGE UP (ref 7–23)
C PNEUM DNA SPEC QL NAA+PROBE: SIGNIFICANT CHANGE UP
CALCIUM SERPL-MCNC: 10.2 MG/DL — SIGNIFICANT CHANGE UP (ref 8.4–10.5)
CHLORIDE SERPL-SCNC: 97 MMOL/L — LOW (ref 98–107)
CO2 SERPL-SCNC: 20 MMOL/L — LOW (ref 22–31)
COCAINE METAB.OTHER UR-MCNC: NEGATIVE — SIGNIFICANT CHANGE UP
COLOR SPEC: YELLOW — SIGNIFICANT CHANGE UP
COMMENT - URINE: SIGNIFICANT CHANGE UP
COVID-19 SPIKE DOMAIN AB INTERP: POSITIVE
COVID-19 SPIKE DOMAIN ANTIBODY RESULT: >250 U/ML — HIGH
CREAT SERPL-MCNC: 1.1 MG/DL — SIGNIFICANT CHANGE UP (ref 0.5–1.3)
CREATININE URINE RESULT, DAU: 655 MG/DL — SIGNIFICANT CHANGE UP
DIFF PNL FLD: NEGATIVE — SIGNIFICANT CHANGE UP
ELLIPTOCYTES BLD QL SMEAR: SLIGHT — SIGNIFICANT CHANGE UP
EOSINOPHIL # BLD AUTO: 0.09 K/UL — SIGNIFICANT CHANGE UP (ref 0–0.5)
EOSINOPHIL NFR BLD AUTO: 0.9 % — SIGNIFICANT CHANGE UP (ref 0–6)
EPI CELLS # UR: 2 /HPF — SIGNIFICANT CHANGE UP (ref 0–5)
ETHANOL SERPL-MCNC: <10 MG/DL — SIGNIFICANT CHANGE UP
FLUAV SUBTYP SPEC NAA+PROBE: SIGNIFICANT CHANGE UP
FLUBV RNA SPEC QL NAA+PROBE: SIGNIFICANT CHANGE UP
GIANT PLATELETS BLD QL SMEAR: PRESENT — SIGNIFICANT CHANGE UP
GLUCOSE SERPL-MCNC: 128 MG/DL — HIGH (ref 70–99)
GLUCOSE UR QL: NEGATIVE — SIGNIFICANT CHANGE UP
HADV DNA SPEC QL NAA+PROBE: SIGNIFICANT CHANGE UP
HCOV 229E RNA SPEC QL NAA+PROBE: SIGNIFICANT CHANGE UP
HCOV HKU1 RNA SPEC QL NAA+PROBE: SIGNIFICANT CHANGE UP
HCOV NL63 RNA SPEC QL NAA+PROBE: SIGNIFICANT CHANGE UP
HCOV OC43 RNA SPEC QL NAA+PROBE: SIGNIFICANT CHANGE UP
HCT VFR BLD CALC: 47.8 % — SIGNIFICANT CHANGE UP (ref 39–50)
HGB BLD-MCNC: 14.7 G/DL — SIGNIFICANT CHANGE UP (ref 13–17)
HMPV RNA SPEC QL NAA+PROBE: SIGNIFICANT CHANGE UP
HPIV1 RNA SPEC QL NAA+PROBE: SIGNIFICANT CHANGE UP
HPIV2 RNA SPEC QL NAA+PROBE: SIGNIFICANT CHANGE UP
HPIV3 RNA SPEC QL NAA+PROBE: SIGNIFICANT CHANGE UP
HPIV4 RNA SPEC QL NAA+PROBE: SIGNIFICANT CHANGE UP
HYALINE CASTS # UR AUTO: 1 /LPF — SIGNIFICANT CHANGE UP (ref 0–7)
IANC: 6.72 K/UL — SIGNIFICANT CHANGE UP (ref 1.5–8.5)
KETONES UR-MCNC: ABNORMAL
LEUKOCYTE ESTERASE UR-ACNC: NEGATIVE — SIGNIFICANT CHANGE UP
LITHIUM SERPL-MCNC: <0.1 MMOL/L — LOW (ref 0.6–1.2)
LYMPHOCYTES # BLD AUTO: 1.82 K/UL — SIGNIFICANT CHANGE UP (ref 1–3.3)
LYMPHOCYTES # BLD AUTO: 18.2 % — SIGNIFICANT CHANGE UP (ref 13–44)
MCHC RBC-ENTMCNC: 21.5 PG — LOW (ref 27–34)
MCHC RBC-ENTMCNC: 30.8 GM/DL — LOW (ref 32–36)
MCV RBC AUTO: 69.9 FL — LOW (ref 80–100)
METAMYELOCYTES # FLD: 0.9 % — SIGNIFICANT CHANGE UP (ref 0–1)
METHADONE UR-MCNC: NEGATIVE — SIGNIFICANT CHANGE UP
MICROCYTES BLD QL: SIGNIFICANT CHANGE UP
MONOCYTES # BLD AUTO: 1.46 K/UL — HIGH (ref 0–0.9)
MONOCYTES NFR BLD AUTO: 14.6 % — HIGH (ref 2–14)
NEUTROPHILS # BLD AUTO: 6.37 K/UL — SIGNIFICANT CHANGE UP (ref 1.8–7.4)
NEUTROPHILS NFR BLD AUTO: 61.8 % — SIGNIFICANT CHANGE UP (ref 43–77)
NEUTS BAND # BLD: 1.8 % — SIGNIFICANT CHANGE UP (ref 0–6)
NITRITE UR-MCNC: NEGATIVE — SIGNIFICANT CHANGE UP
OPIATES UR-MCNC: NEGATIVE — SIGNIFICANT CHANGE UP
OXYCODONE UR-MCNC: NEGATIVE — SIGNIFICANT CHANGE UP
PCP SPEC-MCNC: SIGNIFICANT CHANGE UP
PCP UR-MCNC: NEGATIVE — SIGNIFICANT CHANGE UP
PH UR: 6 — SIGNIFICANT CHANGE UP (ref 5–8)
PLAT MORPH BLD: NORMAL — SIGNIFICANT CHANGE UP
PLATELET # BLD AUTO: 229 K/UL — SIGNIFICANT CHANGE UP (ref 150–400)
PLATELET COUNT - ESTIMATE: NORMAL — SIGNIFICANT CHANGE UP
POIKILOCYTOSIS BLD QL AUTO: SLIGHT — SIGNIFICANT CHANGE UP
POLYCHROMASIA BLD QL SMEAR: SLIGHT — SIGNIFICANT CHANGE UP
POTASSIUM SERPL-MCNC: 3.7 MMOL/L — SIGNIFICANT CHANGE UP (ref 3.5–5.3)
POTASSIUM SERPL-SCNC: 3.7 MMOL/L — SIGNIFICANT CHANGE UP (ref 3.5–5.3)
PROT SERPL-MCNC: 9 G/DL — HIGH (ref 6–8.3)
PROT UR-MCNC: ABNORMAL
RAPID RVP RESULT: SIGNIFICANT CHANGE UP
RBC # BLD: 6.84 M/UL — HIGH (ref 4.2–5.8)
RBC # FLD: 21.3 % — HIGH (ref 10.3–14.5)
RBC BLD AUTO: ABNORMAL
RBC CASTS # UR COMP ASSIST: 1 /HPF — SIGNIFICANT CHANGE UP (ref 0–4)
RSV RNA SPEC QL NAA+PROBE: SIGNIFICANT CHANGE UP
RV+EV RNA SPEC QL NAA+PROBE: SIGNIFICANT CHANGE UP
SALICYLATES SERPL-MCNC: <5 MG/DL — LOW (ref 15–30)
SARS-COV-2 IGG+IGM SERPL QL IA: >250 U/ML — HIGH
SARS-COV-2 IGG+IGM SERPL QL IA: POSITIVE
SARS-COV-2 RNA SPEC QL NAA+PROBE: SIGNIFICANT CHANGE UP
SODIUM SERPL-SCNC: 134 MMOL/L — LOW (ref 135–145)
SP GR SPEC: 1.04 — HIGH (ref 1.01–1.02)
THC UR QL: POSITIVE
TOXICOLOGY SCREEN, DRUGS OF ABUSE, SERUM RESULT: SIGNIFICANT CHANGE UP
TSH SERPL-MCNC: 0.86 UIU/ML — SIGNIFICANT CHANGE UP (ref 0.27–4.2)
UROBILINOGEN FLD QL: SIGNIFICANT CHANGE UP
VARIANT LYMPHS # BLD: 0.9 % — SIGNIFICANT CHANGE UP (ref 0–6)
WBC # BLD: 10.02 K/UL — SIGNIFICANT CHANGE UP (ref 3.8–10.5)
WBC # FLD AUTO: 10.02 K/UL — SIGNIFICANT CHANGE UP (ref 3.8–10.5)
WBC UR QL: 3 /HPF — SIGNIFICANT CHANGE UP (ref 0–5)

## 2021-06-06 PROCEDURE — 99284 EMERGENCY DEPT VISIT MOD MDM: CPT

## 2021-06-06 PROCEDURE — ZZZZZ: CPT

## 2021-06-06 RX ORDER — LITHIUM CARBONATE 300 MG/1
300 TABLET, EXTENDED RELEASE ORAL
Refills: 0 | Status: DISCONTINUED | OUTPATIENT
Start: 2021-06-06 | End: 2021-06-06

## 2021-06-06 RX ORDER — HALOPERIDOL DECANOATE 100 MG/ML
5 INJECTION INTRAMUSCULAR ONCE
Refills: 0 | Status: COMPLETED | OUTPATIENT
Start: 2021-06-06 | End: 2021-06-06

## 2021-06-06 RX ORDER — DIPHENHYDRAMINE HCL 50 MG
25 CAPSULE ORAL DAILY
Refills: 0 | Status: DISCONTINUED | OUTPATIENT
Start: 2021-06-06 | End: 2021-06-06

## 2021-06-06 RX ORDER — RISPERIDONE 4 MG/1
1 TABLET ORAL
Refills: 0 | Status: DISCONTINUED | OUTPATIENT
Start: 2021-06-06 | End: 2021-06-09

## 2021-06-06 RX ADMIN — HALOPERIDOL DECANOATE 5 MILLIGRAM(S): 100 INJECTION INTRAMUSCULAR at 21:21

## 2021-06-06 RX ADMIN — Medication 25 MILLIGRAM(S): at 21:21

## 2021-06-06 RX ADMIN — Medication 2 MILLIGRAM(S): at 21:21
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
## 2021-06-06 NOTE — ED ADULT NURSE NOTE - OBJECTIVE STATEMENT
Received pt in  pt not answering questions asking to be left alone pt appears internally preoccupied support provided eval on going.
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
## 2021-06-06 NOTE — ED BEHAVIORAL HEALTH NOTE - BEHAVIORAL HEALTH NOTE
Patient was seen during evening rounds. Pt's chart , labs and vitals reviewed . Patient required IM medications for agitation this evening at 21:00pm . Patient is found in the room , bizzarre , staring intensely at the writer, demanding that the writer leave and closes the door, refused to answer any questions. Pt is requiring admission, no beds are available, boarding .   Vital Signs Last 24 Hrs  T(C): 37.1 (06 Jun 2021 20:29), Max: 37.1 (06 Jun 2021 20:29)  T(F): 98.8 (06 Jun 2021 20:29), Max: 98.8 (06 Jun 2021 20:29)  HR: 102 (06 Jun 2021 20:29) (102 - 115)  BP: 141/98 (06 Jun 2021 20:29) (123/75 - 141/98)  BP(mean): --  RR: 20 (06 Jun 2021 20:29) (18 - 20)  SpO2: 100% (06 Jun 2021 20:29) (100% - 100%)

## 2021-06-06 NOTE — ED BEHAVIORAL HEALTH ASSESSMENT NOTE - OTHER
not assessed assessment limited secondary to poor engagement unstated assessment limited secondary to poor engagement, however suspect perceptual disturbance activated by mother intense no beds [id number]

## 2021-06-06 NOTE — ED PROVIDER NOTE - PHYSICAL EXAMINATION
Gen: No signs of trauma  ENT: Moist mucous membranes  Neuro: Unable to assess alertness. Pt is awake and opens mouth to command. Pt will follow location directions when commanded. Motor and sensations grossly intact. Gen: No signs of trauma  ENT: Moist mucous membranes  Neuro: Unable to assess orientation. Pt is awake and opens mouth to command. Pt will follow location directions when commanded. Motor and sensations grossly intact.

## 2021-06-06 NOTE — ED BEHAVIORAL HEALTH ASSESSMENT NOTE - DESCRIPTION
Appears internally preoccupied, uncooperative, refusing to engage with writer  Vital Signs Last 24 Hrs  T(C): 36.7 (06 Jun 2021 10:24), Max: 36.7 (06 Jun 2021 10:24)  T(F): 98 (06 Jun 2021 10:24), Max: 98 (06 Jun 2021 10:24)  HR: 115 (06 Jun 2021 10:24) (115 - 115)  BP: 123/75 (06 Jun 2021 10:24) (123/75 - 123/75)  BP(mean): --  RR: 18 (06 Jun 2021 10:24) (18 - 18)  SpO2: 100% (06 Jun 2021 10:24) (100% - 100%) none reported per mom see HPI
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
## 2021-06-06 NOTE — ED BEHAVIORAL HEALTH ASSESSMENT NOTE - OTHER PAST PSYCHIATRIC HISTORY (INCLUDE DETAILS REGARDING ONSET, COURSE OF ILLNESS, INPATIENT/OUTPATIENT TREATMENT)
Two past hospitalization at [facility] with out patient f/u with Dr. Guallpa. Pt has been non complaint with meds and out patient f/u. Dr. guallpa close his case on 5/27/21
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
## 2021-06-06 NOTE — ED PROVIDER NOTE - CLINICAL SUMMARY MEDICAL DECISION MAKING FREE TEXT BOX
26 y/o M p/w agitation and aggressiveness in context of being off psych medications for several weeks. Pt normally takes Risperdal and Lithium. Pt is in NAD. Concern for acute psychosis. Pt is calm in ED. Pt not requiring medications at this time but will observe. Psych consulted for further evaluation.

## 2021-06-06 NOTE — ED ADULT NURSE NOTE - NSIMPLEMENTINTERV_GEN_ALL_ED
Implemented All Fall Risk Interventions:  [place] to call system. Call bell, personal items and telephone within reach. Instruct patient to call for assistance. Room bathroom lighting operational. Non-slip footwear when patient is off stretcher. Physically safe environment: no spills, clutter or unnecessary equipment. Stretcher in lowest position, wheels locked, appropriate side rails in place. Provide visual cue, wrist band, yellow gown, etc. Monitor gait and stability. Monitor for mental status changes and reorient to person, place, and time. Review medications for side effects contributing to fall risk. Reinforce activity limits and safety measures with patient and family.

## 2021-06-06 NOTE — ED BEHAVIORAL HEALTH NOTE - BEHAVIORAL HEALTH NOTE
COVID Exposure Screen- collateral (i.e. third-party, chart review, belongings, etc; include EMS and ED staff)     *Has the patient had a COVID-19 test in the last 90 days?  (  ) Yes   (  X) No   (  ) Unknown- Reason: _____     IF YES PROCEED TO QUESTION #2. IF NO OR UNKNOWN, PLEASE SKIP TO QUESTION #3.     Date of test(s) and result(s): ________     *Has the patient tested positive for COVID-19 antibodies? (  X) Yes   (  ) No   (  ) Unknown- Reason: _____     IF YES PROCEED TO QUESTION #4. IF NO or UNKNOWN, PLEASE SKIP TO QUESTION #5.     Date of positive antibody test: _11/20/20_______     *Has the patient received 2 doses of the COVID-19 vaccine? (  ) Yes   ( X ) No   (  ) Unknown- Reason: _____     IF YES PROCEED TO QUESTION #6. IF NO or UNKNOWN, PLEASE SKIP TO QUESTION #7.      Date of second dose: ________     *In the past 10 days, has the patient been around anyone with a positive COVID-19 test?* (  ) Yes   ( X ) No   (  ) Unknown- Reason: __     IF YES PROCEED TO QUESTION #8. IF NO or UNKNOWN, PLEASE SKIP TO QUESTION #13.     Was the patient within 6 feet of them for at least 15 minutes? (  ) Yes   (  ) No   (  ) Unknown- Reason: _____     Did the patient provide care for them? (  ) Yes   (  ) No   (  ) Unknown- Reason: ______     Did the patient have direct physical contact with them (touched, hugged, or kissed them)? (  ) Yes   (  ) No    (  ) Unknown- Reason: __     Did the patient share eating or drinking utensils with them? (  ) Yes   (  ) No    (  ) Unknown- Reason: ____     Did they sneeze, cough, or somehow get respiratory droplets on the patient? (  ) Yes   (  ) No    (  ) Unknown- Reason: ______     *Has the patient been out of [place] within the past 10 days?* (  ) Yes   ( X ) No   (  ) Unknown- Reason: _____     IF YES PLEASE ANSWER THE FOLLOWING QUESTIONS:     Which state/country did they go to? ______     Were they there over 24 hours? (  ) Yes   (  ) No    (  ) Unknown- Reason: ______     Date of return to [facility]: ______

## 2021-06-06 NOTE — ED BEHAVIORAL HEALTH ASSESSMENT NOTE - RISK ASSESSMENT
Low Acute Suicide Risk Patient is at elevated risk for harm to others given decompensated psychotic illness, non compliant with meds and recent physical aggressive behaviour, risk would be mitigated by hospitalization to inpatient unit.    Patient with low acute risk for suicide, he has no prior history of suicide attempt or suicide ideation, and per collateral has not recently made any suicidal statements. Risk assessment should be re-evaluated when patient is more cooperative with the interview.

## 2021-06-07 VITALS
OXYGEN SATURATION: 100 % | DIASTOLIC BLOOD PRESSURE: 79 MMHG | RESPIRATION RATE: 20 BRPM | HEART RATE: 108 BPM | SYSTOLIC BLOOD PRESSURE: 113 MMHG | TEMPERATURE: 99 F

## 2021-06-07 PROCEDURE — 99213 OFFICE O/P EST LOW 20 MIN: CPT
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
## 2021-06-07 NOTE — ED ADULT NURSE REASSESSMENT NOTE - NS ED NURSE REASSESS COMMENT FT1
RN Rounding Note: Pt sleeping, respirations even and non labored. Pt awaiting inpatient psychiatric bed when available.

## 2021-06-07 NOTE — BH CONSULTATION LIAISON PROGRESS NOTE - OTHER
oppositional assessment limited secondary to poor engagement assessment limited secondary to poor engagement, however suspect perceptual disturbance intense unstated

## 2021-06-07 NOTE — ED ADULT NURSE REASSESSMENT NOTE - NS ED NURSE REASSESS COMMENT FT1
7am - RN Rounding Note: Pt sleeping; respirations even and non labored. Pt continues to wait for inpatient psychiatric bed when it becomes available.

## 2021-06-07 NOTE — ED ADULT NURSE REASSESSMENT NOTE - NS ED NURSE REASSESS COMMENT FT1
pt left -ed with ems for [facility] pt left -ed with ems for [facility]. no issues noted. v/s stable

## 2021-06-07 NOTE — ED BEHAVIORAL HEALTH NOTE - BEHAVIORAL HEALTH NOTE
KELLI called [facility] ([address] [place] [id number] p. [phone] f. [phone]) and spoke with Yolette, in Admissions, who reports one male bed available. [facility] faxed for review. KELLI called [facility] ([address] [place] [address] p. [phone] f. [phone]) and spoke with Yolette, in Admissions, who reports one male bed available. [facility] faxed for review.    KELLI received call from Maria Elena, in Admissions, stating that pt. has been accepted to their facility with bed available.

## 2021-06-07 NOTE — ED BEHAVIORAL HEALTH NOTE - BEHAVIORAL HEALTH NOTE
KELLI called [facility] (P#[phone]) and spoke with TAWANDA Mandujano.  KELLI obtained authorization for inpatient psychiatric admission for [facility]; Authorization #[id number] starting 6/7/2021-6-9/2021. Next Review Date is 6/10/2021. TAWANDA assigned is Anastacio MASCORRO [phone]. Accepting Physician is Dr. Dami Cason.  KELLI spoke with Yoselin in Admissions Office at [facility] and was informed of the above.

## 2021-06-07 NOTE — BH CONSULTATION LIAISON PROGRESS NOTE - CURRENT MEDICATION
MEDICATIONS  (STANDING):  risperiDONE   Tablet 1 milliGRAM(s) Oral two times a day    MEDICATIONS  (PRN):   for severe agitation haldol 5mg/ ativan 2mg PO/IM
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
## 2021-06-07 NOTE — ED ADULT NURSE REASSESSMENT NOTE - NS ED NURSE REASSESS COMMENT FT1
Received pt from last shift, currently calm, resting well in  bed room. no issues noted at this time.

## 2021-06-07 NOTE — BH CONSULTATION LIAISON PROGRESS NOTE - NSBHCHARTREVIEWVS_PSY_A_CORE FT
Vital Signs Last 24 Hrs  T(C): 37.1 (06 Jun 2021 20:29), Max: 37.1 (06 Jun 2021 20:29)  T(F): 98.8 (06 Jun 2021 20:29), Max: 98.8 (06 Jun 2021 20:29)  HR: 102 (06 Jun 2021 20:29) (102 - 115)  BP: 141/98 (06 Jun 2021 20:29) (123/75 - 141/98)  BP(mean): --  RR: 20 (06 Jun 2021 20:29) (18 - 20)  SpO2: 100% (06 Jun 2021 20:29) (100% - 100%)

## 2021-08-05 ENCOUNTER — EMERGENCY (EMERGENCY)
Facility: HOSPITAL | Age: 26
LOS: 1 days | Discharge: HOME | End: 2021-08-05
Attending: EMERGENCY MEDICINE | Admitting: STUDENT IN AN ORGANIZED HEALTH CARE EDUCATION/TRAINING PROGRAM
Payer: MEDICAID

## 2021-08-05 VITALS
DIASTOLIC BLOOD PRESSURE: 50 MMHG | OXYGEN SATURATION: 100 % | HEIGHT: 66 IN | HEART RATE: 96 BPM | RESPIRATION RATE: 18 BRPM | TEMPERATURE: 99 F | SYSTOLIC BLOOD PRESSURE: 106 MMHG

## 2021-08-05 PROCEDURE — 99284 EMERGENCY DEPT VISIT MOD MDM: CPT
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
## 2021-08-05 NOTE — ED ADULT NURSE NOTE - OBJECTIVE STATEMENT
Pt arrived to  nonverbal. As per EMS, pt has been noncompliant with medications and has worsening aggression at home. Pt refusing to answer questions regarding S/I H/I A/H V/H. Pt cooperative and follows directions. Pt changed into  clothing. Personal property collected and logged.
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
## 2021-08-06 ENCOUNTER — EMERGENCY (EMERGENCY)
Facility: HOSPITAL | Age: 26
LOS: 1 days | Discharge: HOME | End: 2021-08-06
Admitting: EMERGENCY MEDICINE
Payer: MEDICAID

## 2021-08-06 VITALS
OXYGEN SATURATION: 100 % | SYSTOLIC BLOOD PRESSURE: 117 MMHG | RESPIRATION RATE: 16 BRPM | HEART RATE: 80 BPM | DIASTOLIC BLOOD PRESSURE: 60 MMHG

## 2021-08-06 VITALS
TEMPERATURE: 98 F | RESPIRATION RATE: 18 BRPM | HEART RATE: 68 BPM | SYSTOLIC BLOOD PRESSURE: 126 MMHG | OXYGEN SATURATION: 100 % | DIASTOLIC BLOOD PRESSURE: 76 MMHG

## 2021-08-06 VITALS
HEART RATE: 83 BPM | OXYGEN SATURATION: 98 % | HEIGHT: 66 IN | DIASTOLIC BLOOD PRESSURE: 77 MMHG | TEMPERATURE: 98 F | SYSTOLIC BLOOD PRESSURE: 12 MMHG | RESPIRATION RATE: 16 BRPM

## 2021-08-06 LAB
ALBUMIN SERPL ELPH-MCNC: 4.5 G/DL — SIGNIFICANT CHANGE UP (ref 3.3–5)
ALP SERPL-CCNC: 72 U/L — SIGNIFICANT CHANGE UP (ref 40–120)
ALT FLD-CCNC: 26 U/L — SIGNIFICANT CHANGE UP (ref 4–41)
ANION GAP SERPL CALC-SCNC: 17 MMOL/L — HIGH (ref 7–14)
AST SERPL-CCNC: 21 U/L — SIGNIFICANT CHANGE UP (ref 4–40)
BASOPHILS # BLD AUTO: 0.08 K/UL — SIGNIFICANT CHANGE UP (ref 0–0.2)
BASOPHILS NFR BLD AUTO: 0.9 % — SIGNIFICANT CHANGE UP (ref 0–2)
BILIRUB SERPL-MCNC: 0.3 MG/DL — SIGNIFICANT CHANGE UP (ref 0.2–1.2)
BUN SERPL-MCNC: 12 MG/DL — SIGNIFICANT CHANGE UP (ref 7–23)
CALCIUM SERPL-MCNC: 9.8 MG/DL — SIGNIFICANT CHANGE UP (ref 8.4–10.5)
CHLORIDE SERPL-SCNC: 101 MMOL/L — SIGNIFICANT CHANGE UP (ref 98–107)
CO2 SERPL-SCNC: 20 MMOL/L — LOW (ref 22–31)
CREAT SERPL-MCNC: 0.89 MG/DL — SIGNIFICANT CHANGE UP (ref 0.5–1.3)
EOSINOPHIL # BLD AUTO: 0.22 K/UL — SIGNIFICANT CHANGE UP (ref 0–0.5)
EOSINOPHIL NFR BLD AUTO: 2.5 % — SIGNIFICANT CHANGE UP (ref 0–6)
GLUCOSE SERPL-MCNC: 91 MG/DL — SIGNIFICANT CHANGE UP (ref 70–99)
HCT VFR BLD CALC: 41.4 % — SIGNIFICANT CHANGE UP (ref 39–50)
HGB BLD-MCNC: 12.8 G/DL — LOW (ref 13–17)
IANC: 3.96 K/UL — SIGNIFICANT CHANGE UP (ref 1.5–8.5)
IMM GRANULOCYTES NFR BLD AUTO: 0.2 % — SIGNIFICANT CHANGE UP (ref 0–1.5)
LYMPHOCYTES # BLD AUTO: 3.33 K/UL — HIGH (ref 1–3.3)
LYMPHOCYTES # BLD AUTO: 37.7 % — SIGNIFICANT CHANGE UP (ref 13–44)
MCHC RBC-ENTMCNC: 22.8 PG — LOW (ref 27–34)
MCHC RBC-ENTMCNC: 30.9 GM/DL — LOW (ref 32–36)
MCV RBC AUTO: 73.7 FL — LOW (ref 80–100)
MONOCYTES # BLD AUTO: 1.22 K/UL — HIGH (ref 0–0.9)
MONOCYTES NFR BLD AUTO: 13.8 % — SIGNIFICANT CHANGE UP (ref 2–14)
NEUTROPHILS # BLD AUTO: 3.96 K/UL — SIGNIFICANT CHANGE UP (ref 1.8–7.4)
NEUTROPHILS NFR BLD AUTO: 44.9 % — SIGNIFICANT CHANGE UP (ref 43–77)
NRBC # BLD: 0 /100 WBCS — SIGNIFICANT CHANGE UP
NRBC # FLD: 0 K/UL — SIGNIFICANT CHANGE UP
PLATELET # BLD AUTO: 268 K/UL — SIGNIFICANT CHANGE UP (ref 150–400)
POTASSIUM SERPL-MCNC: 3.7 MMOL/L — SIGNIFICANT CHANGE UP (ref 3.5–5.3)
POTASSIUM SERPL-SCNC: 3.7 MMOL/L — SIGNIFICANT CHANGE UP (ref 3.5–5.3)
PROT SERPL-MCNC: 7.7 G/DL — SIGNIFICANT CHANGE UP (ref 6–8.3)
RBC # BLD: 5.62 M/UL — SIGNIFICANT CHANGE UP (ref 4.2–5.8)
RBC # FLD: 20 % — HIGH (ref 10.3–14.5)
SARS-COV-2 RNA SPEC QL NAA+PROBE: SIGNIFICANT CHANGE UP
SODIUM SERPL-SCNC: 138 MMOL/L — SIGNIFICANT CHANGE UP (ref 135–145)
TOXICOLOGY SCREEN, DRUGS OF ABUSE, SERUM RESULT: SIGNIFICANT CHANGE UP
TSH SERPL-MCNC: 2.87 UIU/ML — SIGNIFICANT CHANGE UP (ref 0.27–4.2)
WBC # BLD: 8.83 K/UL — SIGNIFICANT CHANGE UP (ref 3.8–10.5)
WBC # FLD AUTO: 8.83 K/UL — SIGNIFICANT CHANGE UP (ref 3.8–10.5)

## 2021-08-06 PROCEDURE — 99285 EMERGENCY DEPT VISIT HI MDM: CPT

## 2021-08-06 NOTE — ED BEHAVIORAL HEALTH ASSESSMENT NOTE - NSSUICRSKFACTOR_PSY_ALL_CORE
Current and Past Psychiatric Diagnoses/Presenting Symptoms/Treatment Related Factors Current and Past Psychiatric Diagnoses/Treatment Related Factors/Activating Events/Stressors

## 2021-08-06 NOTE — ED PROVIDER NOTE - PATIENT PORTAL LINK FT
You can access the FollowMyHealth Patient Portal offered by [facility] by registering at the following website: [email or web address]. By joining [employer]’s FollowMyHealth portal, you will also be able to view your health information using other applications (apps) compatible with our system.

## 2021-08-06 NOTE — ED ADULT TRIAGE NOTE - CHIEF COMPLAINT QUOTE
Patient was brought in by father to get a Invega sustenna 156mg to keep him calm. Patient father says he gets agitated at home when he talks to him and clenches his fist, but no physical acts of aggression. patient denies thoughts to hurt self or others. no visual or auditory hallucination no drugs or alcohol.     [phone]- Father

## 2021-08-06 NOTE — ED BEHAVIORAL HEALTH ASSESSMENT NOTE - OTHER
psychosocial stressors, family stressors, housing stressors, financial stressors activated by aunt neutral CVM by father superficially cooperative while in ED pt did not present with an imminent risk of harm to self or others
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
## 2021-08-06 NOTE — ED BEHAVIORAL HEALTH ASSESSMENT NOTE - OTHER PAST PSYCHIATRIC HISTORY (INCLUDE DETAILS REGARDING ONSET, COURSE OF ILLNESS, INPATIENT/OUTPATIENT TREATMENT)
Two past hospitalizations at [facility], recent inpatient hospitalization at [facility]  per previous record, has been non compliant with meds and out patient f/u. Dr. givens closed his case on 5/27/21

## 2021-08-06 NOTE — ED BEHAVIORAL HEALTH ASSESSMENT NOTE - SUBSTANCE USE
Additional Notes: Have you tested positive for COVID-19 or been diagnosed with COVID-19? No.\\nAre you waiting for pending test results for COVID-19? No.\\nDo you have any of the following symptoms: fever, cough, difficulty breathing, muscle aches/soreness, loss of smell or taste, sore throat, or nausea or diarrhea? No.\\nHave you been in contact with anyone who has COVID-19? No. \\nIs there any reason you can’t wear a mask that covers your nose and mouth for the entire time that you are in the office? No.\\nHave you traveled to [place], [place], [place], [place], [place] or [place] in the last 14 days? No.
Detail Level: Detailed
Yes

## 2021-08-06 NOTE — ED PROVIDER NOTE - PATIENT PORTAL LINK FT
You can access the FollowMyHealth Patient Portal offered by [facility] by registering at the following website: [email or web address]. By joining [employer]’s FollowMyHealth portal, you will also be able to view your health information using other applications (apps) compatible with our system.
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
## 2021-08-06 NOTE — ED BEHAVIORAL HEALTH NOTE - BEHAVIORAL HEALTH NOTE
Patient is refusing to speak , and only stated "close the light ".v Will attempt to assess him when he is ready to engage. Patient is refusing to speak , and only stated "close the light ".v Will attempt to assess him when he is ready to engage.  Attempted to see the pt this morning at 7 am. Pt remains silent and then proceeded to slam the door .

## 2021-08-06 NOTE — ED BEHAVIORAL HEALTH ASSESSMENT NOTE - CURRENT MEDICATION
lithium 900mg daily, Risperdal 3mg daily (non compliant) lithium 900mg daily, Risperdal 3mg daily (non compliant)  invega noncompliant

## 2021-08-06 NOTE — ED BEHAVIORAL HEALTH ASSESSMENT NOTE - DESCRIPTION
see HPI none reported per mom Patient was calm, pleasant and cooperative in the ED and did not exhibit any aggression. Patient did not require any PRN medications or any physical restraints.     Vital Signs Last 24 Hrs  T(C): 37 (05 Aug 2021 22:48), Max: 37 (05 Aug 2021 22:48)  T(F): 98.6 (05 Aug 2021 22:48), Max: 98.6 (05 Aug 2021 22:48)  HR: 80 (06 Aug 2021 09:35) (80 - 96)  BP: 117/60 (06 Aug 2021 09:35) (106/50 - 117/60)  BP(mean): --  RR: 16 (06 Aug 2021 09:35) (16 - 18)  SpO2: 100% (06 Aug 2021 09:35) (100% - 100%) minimally interactive at baseline (as per previous notes)    Vital Signs Last 24 Hrs  T(C): 36.8 (06 Aug 2021 20:23), Max: 36.8 (06 Aug 2021 20:23)  T(F): 98.2 (06 Aug 2021 20:23), Max: 98.2 (06 Aug 2021 20:23)  HR: 83 (06 Aug 2021 20:23) (80 - 83)  BP: 12/77 (06 Aug 2021 20:23) (12/77 - 117/60)  BP(mean): --  RR: 16 (06 Aug 2021 20:23) (16 - 16)  SpO2: 98% (06 Aug 2021 20:23) (98% - 100%)

## 2021-08-06 NOTE — ED ADULT NURSE NOTE - CHIEF COMPLAINT QUOTE
Patient was brought in by father to get a Invega sustenna 156mg to keep him calm. Patient father says he gets agitated at home when he talks to him and clenches his fist, but no physical acts of aggression. patient denies thoughts to hurt self or others. no visual or auditory hallucination no drugs or alcohol.     [phone]- Father

## 2021-08-06 NOTE — ED BEHAVIORAL HEALTH NOTE - BEHAVIORAL HEALTH NOTE
received a call from patient's father [phone] who states patient is on probation and part of his probation is that he has to be in treatment and receive a long acting injection.  He states patient was receiving treatment at the [facility], however patient discontinued his treatment.  Raysar was informed patient was medically and psychiatrically cleared for discharge and pt's father did not want patient returning home.   provided patient with list of shelters, and [facility] serial #[id number]
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
## 2021-08-06 NOTE — ED PROVIDER NOTE - OBJECTIVE STATEMENT
26 y/o M  hx Schizophrenia BIB family for medication adjustment.  Patient appears paranoid and internally preoccupied. Denies  SI/AH/HI/vH. Denies pain, SOB, fever, chills, chest/abdominal discomfort.  Denies use of alcohol or illicit  drugs. No evidence of physical injuries, broken  skin or deformities.  Denies use of alcohol or illicit drugs. Denies falling, punching or kicking any objects.
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
## 2021-08-06 NOTE — ED ADULT NURSE NOTE - OBJECTIVE STATEMENT
Pt arrived to  nonverbal. As per family, pt needs invega injection. Pt cooperative. Refuses to answer questions regarding S/I H/I a/H V/H. Pt changed into  clothing. Personal property collected and logged.

## 2021-08-06 NOTE — ED ADULT NURSE REASSESSMENT NOTE - NS ED NURSE REASSESS COMMENT FT1
0900 Received report from night RN pt calm & cooperative denies si/hi/avh presently pt cleared & by psych eval on going.
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
## 2021-08-06 NOTE — ED PROVIDER NOTE - PROGRESS NOTE DETAILS
RICHMOND: I was signed out this pt pending Psych consult reassessment in AM. Psych tried to talk to pt but he is refusing to speak to psych at this time, I witnessed pt closing the door on psychiatrist. Will continue to monitor while trying to obtain history from pt. Jennie: Received as signout awaiting psych assessment. Cleared for discharge with no plan for medications.
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
## 2021-08-06 NOTE — ED BEHAVIORAL HEALTH ASSESSMENT NOTE - REFERRAL / APPOINTMENT DETAILS
No Continue to follow up with outpatient providers as previously scheduled Advised to continue to follow up with outpatient providers as previously scheduled

## 2021-08-06 NOTE — ED BEHAVIORAL HEALTH ASSESSMENT NOTE - RISK ASSESSMENT
Low Acute Suicide Risk Patient is at elevated risk for harm to others given decompensated psychotic illness, non compliant with meds and recent physical aggressive behaviour, risk would be mitigated by hospitalization to inpatient unit.    Patient with low acute risk for suicide, he has no prior history of suicide attempt or suicide ideation, and per collateral has not recently made any suicidal statements. Risk assessment should be re-evaluated when patient is more cooperative with the interview. Patient with low acute risk for suicide, he has no prior history of suicide attempt or suicide ideation, and per collateral has not recently made any suicidal statements.
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
## 2021-08-06 NOTE — ED BEHAVIORAL HEALTH ASSESSMENT NOTE - VIOLENCE RISK FACTORS:
Impulsivity/Noncompliance with treatment/Irritability Substance abuse/Impulsivity/Noncompliance with treatment/Irritability
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
## 2021-08-06 NOTE — ED BEHAVIORAL HEALTH ASSESSMENT NOTE - DOMICILED WITH
[Restricted in physically strenuous activity but ambulatory and able to carry out work of a light or sedentary nature] : Status 1- Restricted in physically strenuous activity but ambulatory and able to carry out work of a light or sedentary nature, e.g., light house work, office work [Normal] : grossly intact Family

## 2021-08-06 NOTE — ED BEHAVIORAL HEALTH ASSESSMENT NOTE - DESCRIPTION
none reported per mom see HPI Appears internally preoccupied, uncooperative, refusing to engage with writer  Vital Signs Last 24 Hrs  T(C): 36.7 (06 Jun 2021 10:24), Max: 36.7 (06 Jun 2021 10:24)  T(F): 98 (06 Jun 2021 10:24), Max: 98 (06 Jun 2021 10:24)  HR: 115 (06 Jun 2021 10:24) (115 - 115)  BP: 123/75 (06 Jun 2021 10:24) (123/75 - 123/75)  BP(mean): --  RR: 18 (06 Jun 2021 10:24) (18 - 18)  SpO2: 100% (06 Jun 2021 10:24) (100% - 100%) Patient was calm, pleasant and cooperative in the ED and did not exhibit any aggression. Patient did not require any PRN medications or any physical restraints.     Vital Signs Last 24 Hrs  T(C): 37 (05 Aug 2021 22:48), Max: 37 (05 Aug 2021 22:48)  T(F): 98.6 (05 Aug 2021 22:48), Max: 98.6 (05 Aug 2021 22:48)  HR: 80 (06 Aug 2021 09:35) (80 - 96)  BP: 117/60 (06 Aug 2021 09:35) (106/50 - 117/60)  BP(mean): --  RR: 16 (06 Aug 2021 09:35) (16 - 18)  SpO2: 100% (06 Aug 2021 09:35) (100% - 100%)

## 2021-08-06 NOTE — ED PROVIDER NOTE - CLINICAL SUMMARY MEDICAL DECISION MAKING FREE TEXT BOX
Medical evaluation performed. There is no clinical evidence of intoxication or any acute medical problem requiring immediate intervention. Patient is awaiting psychiatric consultation. Final disposition will be determined by psychiatrist. 26 y/o M  hx Schizophrenia   Medical evaluation performed. There is no clinical evidence of intoxication or any acute medical problem requiring immediate intervention. Patient is awaiting psychiatric consultation. Final disposition will be determined by psychiatrist.

## 2021-08-06 NOTE — ED BEHAVIORAL HEALTH ASSESSMENT NOTE - RISK ASSESSMENT
Low Acute Suicide Risk Patient with low acute risk for suicide, he has no prior history of suicide attempt or suicide ideation, and per collateral has not recently made any suicidal statements.

## 2021-08-07 LAB
COVID-19 SPIKE DOMAIN AB INTERP: POSITIVE
COVID-19 SPIKE DOMAIN ANTIBODY RESULT: >250 U/ML — HIGH
SARS-COV-2 IGG+IGM SERPL QL IA: >250 U/ML — HIGH
SARS-COV-2 IGG+IGM SERPL QL IA: POSITIVE

## 2021-08-07 NOTE — ED BEHAVIORAL HEALTH NOTE - BEHAVIORAL HEALTH NOTE
Writer contacted pt's father regarding pt's discharge. Father unhappy with news was informed of follow up options. Father informed of [facility] open next on Monday. Father declines to pick pt up. [facility] card #[phone] given to pt. Father aware.

## 2021-08-08 NOTE — ED POST DISCHARGE NOTE - REASON FOR FOLLOW-UP
COVID-19 AB : detected. Patient contact # [phone] Mom's phone gave me patient's # [phone] discussed with patient COVID-19 AB present. Other

## 2021-09-29 NOTE — ED BEHAVIORAL HEALTH ASSESSMENT NOTE - NS ED BHA MED ROS HEMATOLOGIC LYMPHATIC
PSYCHOTHERAPY 1:1 NOTE:    Met with pt for 1:1 today.  Patient presented as: verbal, interactive  Patient's primary areas of concern include: Pt apologized to writer for her behavior during initial interaction with writer. Pt reports she is feeling better physically, but continues to focus on suicide.  Behavioral observations:Pt sat up in bed to talk to writer for the first time-usually laying in bed facing away. Has brighter affect  Therapeutic interventions: understanding triggers/warning signs, connection to family/community supports, encourage medication and treatment compliance, educate on positive coping skills/relapse prevention techniques and provided support  Patient's response:Pt engaged in conversation and verbalized willingness to attend group today  Patient needs to work on the following to be ready for discharge:Pt demonstrates ability to control suicidal impulses  Patient would be at high risk for decompensation if discharged at this time.  Peggy Lagos LCSW, SAC       No complaints

## 2022-07-11 NOTE — ED BEHAVIORAL HEALTH ASSESSMENT NOTE - CURRENT ACTIVE IDEATION
Procedures  Physical Exam  Review of Systems    5/16/202011:07 PM  I have personally seen and examined the patient.  I reviewed and agree with the PA/NP/Resident's assessment and plan of care.    My examination, assessment, and plan of care of Kishan Cade is as follows:  The patient presents with epigastric abd pain. Ongoing for several weeks. Sometimes worse after eating. Hx of gluten sensitivity as a child. Had celiac testing a year ago and was negative so has been eating gluten for a year now. Has not tried food elimination diet yet. Has not tried food diary. No fever, vomiting or diarrhea. No significant use of NSAIDs. Pt also endorses chronic constipation. Occasionally takes colace or miralax but nothing recent.    Exam: Well appearing, non-toxic. Abdomen soft and non-tender, non-distended.    Impression/Plan: Labs benign. Possibly food related as she previously adhered to gluten free diet but no longer does. Pt also with constipation issues. Recommended daily stool softeners, increased fiber intake and removal of gluten from diet for next several weeks as well as outpatient GI f/u. Discussed this with patient and mother at bedside.      I was physically present for the key/critical portions of the following procedures: None     Radha Waddell MD  05/16/20 [id number]    
- - -
Unable to assess

## 2022-10-07 NOTE — ED BEHAVIORAL HEALTH ASSESSMENT NOTE - OTHER PAST PSYCHIATRIC HISTORY (INCLUDE DETAILS REGARDING ONSET, COURSE OF ILLNESS, INPATIENT/OUTPATIENT TREATMENT)
impaired Two past hospitalization at [facility] with out patient f/u with Dr. Guallpa. Pt has been non complaint with meds and out patient f/u. Dr. guallpa close his case on 5/27/21 Two past hospitalizations at [facility], recent inpatient hospitalization at [facility]  per previous record, has been non compliant with meds and out patient f/u. Dr. givens closed his case on 5/27/21

## 2023-09-20 NOTE — BH INPATIENT PSYCHIATRY DISCHARGE NOTE - NSBHSUBSTUSED_PSY_A_CORE
Left ear washing today  Flu vaccine today  Schedule ABIs at rest and with exercise for leg pain with walking  Continue the same medication.  Keep appointment with the other healthcare providers  Low-salt diet.  Low-fat diet.  Stay active.  Fall precautions.  Exercise regularly and safely 4 to 5 times a week or 150 minutes a week.  Recommend regular dental exam.  Recommend regular eye exam.  Please protect your skin from excessive sun exposure, use sunscreen, wear protective clothing.  Call if any questions or concerns at any time.   See me in 6 months for follow up visit with a pre visit fasting BMP urine protein creatinine ratio  Medicare Wellness Visit  Plan for Preventive Care    A good way for you to stay healthy is to use preventive care.  Medicare covers many services that can help you stay healthy.* The goal of these services is to find any health problems as quickly as possible. Finding problems early can help make them easier to treat.  Your personal plan below lists the services you may need and when they are due.      Health Maintenance Summary     Medicare Advantage- Medicare Wellness Visit (Yearly - January to December)  Overdue since 1/1/2023    Depression Screening (Yearly)  Next due on 9/20/2024    DTaP/Tdap/Td Vaccine (2 - Td or Tdap)  Next due on 6/28/2027    Pneumococcal Vaccine 65+   Completed    COVID-19 Vaccine   Completed    Shingles Vaccine   Completed    Influenza Vaccine   Completed    Meningococcal Vaccine   Aged Out    Hepatitis B Vaccine (For Physician/APC Discussion)   Aged Out    HPV Vaccine   Aged Out           Preventive Care for Women and Men    Heart Screenings (Cardiovascular):  · Blood tests are used to check your cholesterol, lipid and triglyceride levels. High levels can increase your risk for heart disease and stroke. High levels can be treated with medications, diet and exercise. Lowering your levels can help keep your heart and blood vessels healthy.  Your provider will  order these tests if they are needed.    · An ultrasound is done to see if you have an abdominal aortic aneurysm (AAA).  This is an enlargement of one of the main blood vessels that delivers blood to the body.   In the [place], 9,000 deaths are caused by AAA.  You may not even know you have this problem and as many as 1 in 3 people will have a serious problem if it is not treated.  Early diagnosis allows for more effective treatment and cure.  If you have a family history of AAA or are a male age 65-75 who has smoked, you are at higher risk of an AAA.  Your provider can order this test, if needed.    Colorectal Screening:  · There are many tests that are used to check for cancer of your colon and rectum. You and your provider should discuss what test is best for you and when to have it done.  Options include:  · Screening Colonoscopy: exam of the entire colon, seen through a flexible lighted tube.  · Flexible Sigmoidoscopy: exam of the last third (sigmoid portion) of the colon and rectum, seen through a flexible lighted tube.  · Cologuard DNA stool test: a sample of your stool is used to screen for cancer and unseen blood in your stool.  · Fecal Occult Blood Test: a sample of your stool is studied to find any unseen blood    Flu Shot:  · An immunization that helps to prevent influenza (the flu). You should get this every year. The best time to get the shot is in the fall.    Pneumococcal Shot:  • Vaccines help prevent pneumococcal disease, which is any type of illness caused by Streptococcus pneumoniae bacteria. There are two kinds of pneumococcal vaccines available in the [place]:   o Pneumococcal conjugate vaccines (PCV20 or Ccsceke40®)  o Pneumococcal polysaccharide vaccine (PPSV23 or Oeuqtymuf14®)  · For those who have never received any pneumococcal conjugate vaccine, CDC recommends PVC20 for adults 65 years or older and adults 19 through 64 years old with certain medical conditions or risk factors.    · For those who have previously received PCV13, this should be followed by a dose of PPSV23.     Hepatitis B Shot:  · An immunization that helps to protect people from getting Hepatitis B. Hepatitis B is a virus that spreads through contact with infected blood or body fluids. Many people with the virus do not have symptoms.  The virus can lead to serious problems, such as liver disease. Some people are at higher risk than others. Your doctor will tell you if you need this shot.     Diabetes Screening:  · A test to measure sugar (glucose) in your blood is called a fasting blood sugar. Fasting means you cannot have food or drink for at least 8 hours before the test. This test can detect diabetes long before you may notice symptoms.    Glaucoma Screening:  · Glaucoma screening is performed by your eye doctor. The test measures the fluid pressure inside your eyes to determine if you have glaucoma.     Hepatitis C Screening:  · A blood test to see if you have the hepatitis C virus.  Hepatitis C attacks the liver and is a major cause of chronic liver disease.  Medicare will cover a single screening for all adults born between 1945 & 1965, or high risk patients (people who have injected illegal drugs or people who have had blood transfusions).  High risk patients who continue to inject illegal drugs can be screened for Hepatitis C every year.    Smoking and Tobacco-Use Cessation Counseling:  · Tobacco is the single greatest cause of disease and early death in our country today. Medication and counseling together can increase a person’s chance of quitting for good.   · Medicare covers two quitting attempts per year, with four counseling sessions per attempt (eight sessions in a 12 month period)    Preventive Screening tests for Women    Screening Mammograms and Breast Exams:  · An x-ray of your breasts to check for breast cancer before you or your doctor may be able to feel it.  If breast cancer is found early it can  usually be treated with success.    Pelvic Exams and Pap Tests:  · An exam to check for cervical and vaginal cancer. A Pap test is a lab test in which cells are taken from your cervix and sent to the lab to look for signs of cervical cancer. If cancer of the cervix is found early, chances for a cure are good. Testing can generally end at age 65, or if a woman has a hysterectomy for a benign condition. Your provider may recommend more frequent testing if certain abnormal results are found.    Bone Mass Measurements:  · A painless x-ray of your bone density to see if you are at risk for a broken bone. Bone density refers to the thickness of bones or how tightly the bone tissue is packed.    Preventive Screening tests for Men    Prostate Screening:  · Should you have a prostate cancer test (PSA)?  It is up to you to decide if you want a prostate cancer test. Talk to your clinician to find out if the test is right for you.  Things for you to consider and talk about should include:  · Benefits and harms of the test  · Your family history  · How your race/ethnicity may influence the test  · If the test may impact other medical conditions you have  · Your values on screenings and treatments    *Medicare pays for many preventive services to keep you healthy. For some of these services, you might have to pay a deductible, coinsurance, and / or copayment.  The amounts vary depending on the type of services you need and the kind of Medicare health plan you have.    For further details on screenings offered by Medicare please visit: https://www.medicare.gov/coverage/preventive-screening-services    Cannabis

## 2024-01-16 NOTE — ED BEHAVIORAL HEALTH ASSESSMENT NOTE - PRIMARY DX
[facility]  Medicine Progress Note - Hospitalist Service  Date of Admission:  1/10/2024    Assessment & Plan   Ms. Lani Walker is a 64 year old female with PMHx of metastatic pancreatic cancer (contemplating 3rd line therapy under management of Dr. Melara), who was admitted on 1/10/2024 with confusion and weakness, FTH hypercalcemia. Now with worsening lactcic acidosis and leukocytosis pending further infectious work up, nephrology consulted for worsening STEPHANIE. Pending ongoing clinical improvement. GOC discussion ongoing.        Encephalopathy, likely multifactorial, improving   Hypercalcemia, improved to stable   Leukocytosis, worsening   Anion gap metabolic acidosis, Lactic acidosis, persistent   Presenting with 1 day of confusion and weakness. Workup notable for a calcium of 13.9 (ionized 7.8), WBC of 40.4 with more than 80% neutrophils, no acute pathology on CT head. Received IVF and calcitonin in ER. Mental status had improvement after IVF. Ca responding to calcitonin (13.9->12->11.1->10.7).   WBC has been rising based on review of labs from [facility] with most recent count 23.2 on 1/8/24. Unclear etiology, but possible infection must be considered. UA negative, COVID/flu/RSV negative, US abdomen unrevealing. Patient did report some increased dyspnea. Had recent CT CAP 1/3/24 that did not show specific cause of dyspnea, but does document marked interval progression of disease. TTE was hyperdynamic but also did not reveal cause of dyspnea.   Given elevated WBC and bilirubin, H/O consulted GI for possible cholangitis. GI felt ERCP is unlikely to benefit patient as there is no ductal dilation and could potentially worsen the case as bacteria would be injected up into the bile duct. MRCP was obtained 1/13/24 with findings as below including increased ascites. Patient remained confused and was becoming weaker, despite improvement in calcium. Per her oncology team on 1/13/24, she had new  RUQ pain relative to a week ago and given that WBC and bilirubin continue to rise infectious work up was pursued and CT CAP is now pending 01/14/24.   - generous fluid support with  ml/hr (increased from 100 /hr 01/14/24)   - s/p Calcitonin and Zoledronic acid   - MRCP with no evidence for infection despite persistently worsening WBC, will obtain CT CAP 01/14/24 to evaluate for other infectious source   1.  Hepatomegaly secondary to innumerable hepatic metastases has progressed in the short interval since 01/03/2024. There is a relatively small volume of normal functioning hepatic parenchyma which likely accounts for the rising bilirubin.  2.  Large pancreatic tail mass invades the spleen, left kidney, left adrenal gland, posterior wall proximal stomach, left diaphragmatic adán and occludes the splenic vein.   3.  Severe subcutaneous and intra-abdominal fat edema and moderate volume ascites have all progressed.  - paracentesis (therapeutic and diagnotic) ordered  in setting of enlarging ascites and encephalopathy - however has been on pip-tazo which would cover for SBP, planning to perform on 1/15/24   - CT CAP pending 01/14/24   - sepsis protocol,   ml bolus x2, repeat lactic acid   - adding vancomycin until other infectious process ruled out   - repeat blood cultures pending   - if no infectious source is found, lab abnormalities may be related to progressive disease      Possible SBP  Patient with significant ascites likely 2/2 cancer. Paracentesis performed with 826 nucleated cells, 520 of which were neutrophils. Technically meets SBP criteria, but without organisms found on gram stain. Final cultures pending. Continuing zosyn as patient does feels slightly clinically improved, as well.    STEPHANIE , worsening   Cr now at 1.7 from 1.5. unclear etiology. Has been getting IVF so unlikely hypovolemia. May be related to Zometa. Still having good urine output. If worsening, consider nephrology consult.  -  avoid nephrotoxins as able - added vancomycin to zosyn today, however creatinine had been rising prior to this   - nephrology consult today as has continued to worsen despite adequate fluid resuscitation   - AM CMP     Metastatic pancreatic adenocarcinoma    Innumerable metastasis   Follows with Dr. Melara. Initially did well with FOLFOX, but subsequently recurred. Most recently on gemcitabine and abraxane as of 12/26/23.   - Oncology following, appreciate assistance     Pancreatic insufficiency  PTA on 12-18U lantus. Has not been eating much recently. BG in 100s and has been stable.   - sliding scale insulin     Goals of care  Discussed GOC with patient, daughter, sister, and another family member today. Informed patient that with the rapid progression of cancer, progressive infiltrative liver injury, and now kidney injury patient is exhibiting signs of multiorgan injury/failure related to cancer. Informed patient that she is likely dying, and with continued decline in functional status it is unlikely she will improve enough to receive any further chemotherapy. Discussed that it would be appropriate to start to discuss hospice cares/DNR/DNI, if that patient is open to doing so. Palliative care met with patient and family on 1/16/2024 with decision to discharge to facility for hospice cares. We did not elected to start comfort cares here, however, and will continue all current treatments within reason. Can continue SBP treatment with oral medications at discharge if needed.        Diet: Snacks/Supplements Adult: Ensure Enlive; With Meals  Regular Diet Adult    DVT Prophylaxis: Enoxaparin (Lovenox) SQ  Malin Catheter: Not present  Lines: PRESENT      Port a Cath 06/15/22 Single Lumen Right Chest wall-Site Assessment: WDL      Cardiac Monitoring: None  Code Status: No CPR- Do NOT Intubate    Sister,  and daughter updated at bedside 01/14/24 - all questions were answered     Clinically Significant Risk Factors     "       # Hypercalcemia: corrected calcium is >10.1, will monitor as appropriate    # Hypoalbuminemia: Lowest albumin = 2.1 g/dL at 1/15/2024  5:40 AM, will monitor as appropriate      # Acute Kidney Injury, unspecified: based on a >150% or 0.3 mg/dL increase in last creatinine compared to past 90 day average, will monitor renal function  # Hypertension: Noted on problem list        # Overweight: Estimated body mass index is 28.15 kg/m  as calculated from the following:    Height as of this encounter: 1.676 m (5' 6\").    Weight as of this encounter: 79.1 kg (174 lb 6.1 oz).             Disposition Plan      Expected Discharge Date: 01/19/2024                    Davin Pena MD  Hospitalist Service  [facility]  Securely message with [facility] (more info)  Text page via [facility] Paging/Directory   ______________________________________________________________________    Interval History   Patient with slightly increased energy today. Does not have much of an appetite, but eager to eat her strawberry shortcake/briana food cake. Family at bedside, and thinks she has been awake more today.    Physical Exam   Temp: 97.9  F (36.6  C) Temp src: Axillary BP: 115/79 Pulse: 94   Resp: 18 SpO2: 99 % O2 Device: None (Room air)   Height: 167.6 cm (5' 6\") Weight: 79.1 kg (174 lb 6.1 oz)  Estimated body mass index is 28.15 kg/m  as calculated from the following:    Height as of this encounter: 1.676 m (5' 6\").    Weight as of this encounter: 79.1 kg (174 lb 6.1 oz).    General: Very pleasant female resting comfortably in hospital bed.  Awake, alert, interactive. Family at bedside. Very thin/frail.  HEENT: Normocephalic, atraumatic.  PERRL, EOMI.  Conjunctiva clear, sclerae anicteric.  Mucous membranes moist. Cap on head.  Cardiac: Regular rate and rhythm without murmur, gallop, or rub.   Respiratory: Normal work of breathing.  Clear to auscultation bilaterally without wheezing, rales, or rhonchi.  GI: Normal, active " bowel sounds.  Abdomen soft, nontender, nondistended.  : Deferred.  Musculoskeletal: Moving all extremities appropriately.  Skin: No rashes or abrasions on exposed skin.  Neurologic: Intermittently confused and repeating questions.  Psychologic: Appropriate mood and affect.      Medical Decision Making       45 MINUTES SPENT BY ME on the date of service doing chart review, history, exam, documentation & further activities per the note.      Data     I have personally reviewed the following data over the past 24 hrs:    N/A  \   N/A   / 181     N/A N/A N/A /  168 (H)   N/A N/A 2.41 (H) \        Schizoaffective disorder, bipolar type

## 2024-04-29 NOTE — BH INPATIENT PSYCHIATRY PROGRESS NOTE - NSBHMSEAFFCONG_PSY_A_CORE
[FreeTextEntry1] : This note was written by Kiana Philip on 04/24/2024 acting as scribe for Dr. Phoenix Reaves M.D.   I, Dr. Phoenix Reaves, have read and attest that all the information, medical decision making and discharge instructions within are true and accurate. 
Congruent
Congruent

## 2024-09-26 NOTE — ED ADULT TRIAGE NOTE - TEMPERATURE IN CELSIUS (DEGREES C)
Detail Level: Detailed Depth Of Biopsy: dermis Was A Bandage Applied: Yes Size Of Lesion In Cm: 1 X Size Of Lesion In Cm: 0.6 Biopsy Type: H and E Biopsy Method: Personna blade Anesthesia Type: 1% lidocaine with 1:200,000 epinephrine Anesthesia Volume In Cc: 1.5 36.7 Additional Anesthesia Volume In Cc (Will Not Render If 0): 0 Hemostasis: Aluminum Chloride Wound Care: Petrolatum Dressing: bandage Destruction After The Procedure: No Type Of Destruction Used: Curettage Cryotherapy Text: The wound bed was treated with cryotherapy after the biopsy was performed. Electrodesiccation Text: The wound bed was treated with electrodesiccation after the biopsy was performed. Electrodesiccation And Curettage Text: The wound bed was treated with electrodesiccation and curettage after the biopsy was performed. Silver Nitrate Text: The wound bed was treated with silver nitrate after the biopsy was performed. Lab: [id number] Lab Facility: [id number] Consent: Written consent was obtained and risks were reviewed including but not limited to scarring, infection, bleeding, scabbing, incomplete removal, nerve damage and allergy to anesthesia. Post-Care Instructions: I reviewed with the patient in detail post-care instructions. Patient is to keep the biopsy site dry overnight, and then apply polysporin/Vaseline x 3 days. Notification Instructions: Patient will be notified of biopsy results. However, patient instructed to call the office if not contacted within 2 weeks. Billing Type: Third-Party Bill Information: Selecting Yes will display possible errors in your note based on the variables you have selected. This validation is only offered as a suggestion for you. PLEASE NOTE THAT THE VALIDATION TEXT WILL BE REMOVED WHEN YOU FINALIZE YOUR NOTE. IF YOU WANT TO FAX A PRELIMINARY NOTE YOU WILL NEED TO TOGGLE THIS TO 'NO' IF YOU DO NOT WANT IT IN YOUR FAXED NOTE.

## 2025-06-23 NOTE — ED ADULT NURSE REASSESSMENT NOTE - GENERAL PATIENT STATE
Pt returned call and screening questions were answered.  
The patient had a VV on  6/20/2025 and still needs screening for his Depressing and SDOH screen.  Attempted to call the patient to ask hiim the screening s=questions, left a VM asking him to call the office back.  
comfortable appearance
comfortable appearance

## 2025-07-16 NOTE — ED PROVIDER NOTE - PMH
MA returned call to Kevin to check on Mr Moon they informed me the pt with rhinovirus is in isolation and he will check on Mr Moon    No pertinent past medical history